# Patient Record
Sex: FEMALE | Race: BLACK OR AFRICAN AMERICAN | NOT HISPANIC OR LATINO | URBAN - METROPOLITAN AREA
[De-identification: names, ages, dates, MRNs, and addresses within clinical notes are randomized per-mention and may not be internally consistent; named-entity substitution may affect disease eponyms.]

---

## 2019-09-25 ENCOUNTER — WALK IN (OUTPATIENT)
Dept: URGENT CARE | Age: 29
End: 2019-09-25

## 2019-09-25 VITALS
TEMPERATURE: 98.7 F | RESPIRATION RATE: 18 BRPM | HEART RATE: 66 BPM | SYSTOLIC BLOOD PRESSURE: 108 MMHG | DIASTOLIC BLOOD PRESSURE: 78 MMHG

## 2019-09-25 DIAGNOSIS — Z00.00 NORMAL PHYSICAL EXAM: Primary | ICD-10-CM

## 2019-09-25 DIAGNOSIS — J00 ACUTE NASOPHARYNGITIS (COMMON COLD): ICD-10-CM

## 2019-09-25 DIAGNOSIS — H66.001 NON-RECURRENT ACUTE SUPPURATIVE OTITIS MEDIA OF RIGHT EAR WITHOUT SPONTANEOUS RUPTURE OF TYMPANIC MEMBRANE: ICD-10-CM

## 2019-09-25 PROCEDURE — X0943 AMG SELF PAY VISIT: HCPCS | Performed by: NURSE PRACTITIONER

## 2019-09-25 RX ORDER — AMOXICILLIN AND CLAVULANATE POTASSIUM 875; 125 MG/1; MG/1
1 TABLET, FILM COATED ORAL 2 TIMES DAILY
Qty: 20 TABLET | Refills: 0 | Status: SHIPPED | OUTPATIENT
Start: 2019-09-25 | End: 2019-10-05

## 2019-09-25 ASSESSMENT — ENCOUNTER SYMPTOMS
DIZZINESS: 0
COUGH: 0
HEADACHES: 1
LIGHT-HEADEDNESS: 0
CONSTITUTIONAL NEGATIVE: 1
RHINORRHEA: 1
TROUBLE SWALLOWING: 0
RESPIRATORY NEGATIVE: 1
NUMBNESS: 0
SORE THROAT: 0
SINUS PRESSURE: 1
WEAKNESS: 0

## 2019-09-26 ENCOUNTER — TELEPHONE (OUTPATIENT)
Dept: SCHEDULING | Age: 29
End: 2019-09-26

## 2025-01-17 ENCOUNTER — TELEPHONE (OUTPATIENT)
Dept: OBGYN CLINIC | Facility: CLINIC | Age: 35
End: 2025-01-17

## 2025-01-20 NOTE — TELEPHONE ENCOUNTER
Name and  verified. Patient would like to establish care with the midwife office. Patient does see an NP somewhere else but this RN said to just establish care here so we do not have to request records. Missed menses appointment scheduled for  with Katherine

## 2025-01-31 ENCOUNTER — OFFICE VISIT (OUTPATIENT)
Dept: OBGYN CLINIC | Facility: CLINIC | Age: 35
End: 2025-01-31
Payer: COMMERCIAL

## 2025-01-31 VITALS
WEIGHT: 172 LBS | DIASTOLIC BLOOD PRESSURE: 83 MMHG | HEIGHT: 63 IN | BODY MASS INDEX: 30.48 KG/M2 | SYSTOLIC BLOOD PRESSURE: 118 MMHG | HEART RATE: 92 BPM

## 2025-01-31 DIAGNOSIS — N92.6 MISSED MENSES: Primary | ICD-10-CM

## 2025-01-31 DIAGNOSIS — O99.210 OBESITY IN PREGNANCY (HCC): ICD-10-CM

## 2025-01-31 LAB
CONTROL LINE PRESENT WITH A CLEAR BACKGROUND (YES/NO): YES YES/NO
KIT LOT #: NORMAL NUMERIC
PREGNANCY TEST, URINE: POSITIVE

## 2025-01-31 PROCEDURE — 99203 OFFICE O/P NEW LOW 30 MIN: CPT | Performed by: ADVANCED PRACTICE MIDWIFE

## 2025-01-31 PROCEDURE — 81025 URINE PREGNANCY TEST: CPT | Performed by: ADVANCED PRACTICE MIDWIFE

## 2025-01-31 NOTE — PROGRESS NOTES
CHIEF COMPLAINT:  Chief Complaint   Patient presents with    Gyn Exam     Missed mense; LMP 2024, 6w 6d , No nausea no spotting only cramping a week before finding out.         HPI:  Lainey is 34 year old, here today, with her partner, for a missed menses visit.  Currently, she is feeling well. Denies 1st trimester danger signs. Happy about pregnancy. They have been trying to conceive. Taking prenatal vitamins and vitamin D.    Patient's last menstrual period was 2024 (exact date).  Period Cycle (Days): 28 day  Period Duration (Days): 5-6 days  Period Flow: moderate  Use of Birth Control (if yes, specify type): None  Hx Prior Abnormal Pap: No  Pap Date: 24  Pap Result Notes: normal       2024, chemical pregnancy  2024, chemical pregnancy  Current, denies 1T danger signs  LMP 25 --> 6w6d --> HALINA 25    /FOB: present, supportive  Family H/O of genetic disorders: denies  Cats at home: no. 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 No pertinent surgical history.   Family History   Problem Relation Age of Onset    Diabetes Father       Social History:   Social History     Socioeconomic History    Marital status:    Tobacco Use    Smoking status: Never    Smokeless tobacco: Never   Substance and Sexual Activity    Alcohol use: Not Currently    Drug use: Never     Social Drivers of Health      Received from [facility]          Medications (Active prior to today's visit):  No current outpatient medications on file.       Allergies:  Allergies[1]    REVIEW OF SYSTEMS:  Review of Systems   Constitutional: Negative.    Gastrointestinal:  Negative for nausea and vomiting.   Genitourinary:  Negative for pelvic pain and vaginal bleeding.        PHYSICAL  EXAM:  Vitals:    01/31/25 0803   BP: 118/83   Pulse: 92     Physical Exam  Vitals reviewed.   Constitutional:       Appearance: Normal appearance.   HENT:      Head: Normocephalic.   Pulmonary:      Effort: Pulmonary effort is normal.   Neurological:      Mental Status: She is alert and oriented to person, place, and time.   Psychiatric:         Behavior: Behavior normal.         Thought Content: Thought content normal.         Judgment: Judgment normal.          Recent Results (from the past 24 hours)   POC Urine pregnancy test [[id number]]    Collection Time: 01/31/25  8:16 AM   Result Value Ref Range    Pregnancy Test, Urine positive     Control Line Present with a clear background (yes/no) yes Yes/No    Kit Lot # [id number] Numeric    Kit Expiration Date 02/03/2026 Date        ASSESSMENT/PLAN:   Lainey was seen today for gyn exam.    Diagnoses and all orders for this visit:    Missed menses  -     POC Urine pregnancy test [[id number]]    Obesity in pregnancy (HCC)          - Will need baby ASA after 1st trimester     Today's UCG positive result reviewed with patient  Appropriate for CNM care   Already taking prenatal vitamins      Pre-eclampsia Risk Assessment:   High Risk Factors (any 1 of below):   - H/O preeclampsia in prior pregnancy  - Autoimmune disease (lupus, antiphospholipid syndrome)  - Multifetal pregnancy  - cHTN  - Diabetes  Moderate Risk Factors (any 2 of below)    - Nulliparus  - Obesity  - H/O preeclampsia in 1st degree relative  - Socioeconomic characeristics (AA race, low socioeconomic status)  - AMA  - Personal H/O prior SGA or low birth weight, adverse pregnancy outcome, >10yr pregnancy interval    Next visit: Patient to schedule Nurse Education visit, next available, and NOB for 12wga    Counseling included:  -- CNM care and protocols  -- Discussed importance of folic acid and vitamin D  -- Reviewed pregnancy recommendations regarding weight gain, diet/hydration, and food safety  -- Travel discussed,  avoid travel to zika zones/mosquito bites if in these zones  -- Warning signs reviewed. Advised to call office if occur  -- Reviewed genetic/chromosomal testing options for pregnancies  -- Evidence that baby ASA reduces risk of preeclampsia  -- Schedule RN OB ED visit   -- I have spent 30 minutes total time on the day of the encounter, including: preparing to see the patient, ordering/reviewing labs, performing a medically appropriate exam, and providing care coordination. face to face counseling, chart review, orders and coordination of care    Pt verbalized understanding. All questions answered. No barriers to learning identified         [1] Not on File

## 2025-01-31 NOTE — PATIENT INSTRUCTIONS
Healthy Eating Habits During Pregnancy    It’s important to develop healthy eating habits while you are pregnant, for you as well as for your baby. Here are some ways to stay healthy.  Aim for a healthy weight  A slow, steady rate of weight gain is often best. After the first trimester, you may gain about a pound a week. If you were overweight before pregnancy, you need to gain fewer pounds. Your healthcare provider can give you a healthy weight goal for your pregnancy.  Don’t diet  Now is not the time to diet. You may not get enough of the nutrients you and your baby need. Instead, learn how to be a healthy eater. Start by doing it for your baby. Soon, you may do it for yourself.  Vitamins and supplements  Talk with your healthcare provider about taking these and other prenatal vitamins and supplements.  Iron makes the extra blood you need now.  Calcium and vitamin D help build and keep strong bones.  Folic acid helps prevent certain birth defects.  Iodine helps the thyroid work right.  Some vitamins may not be safe to take. Your healthcare provider will tell you which ones to avoid.  Fluids  Drink at least 8 to 10 cups of fluid daily. Your baby needs fluids. Fluids also decrease constipation, flush out toxins and waste, limit swelling, and help prevent bladder infections. Water is best. Other good choices are:  Water or seltzer water with a slice of lemon or lime (These can also help ease an upset stomach.)  Clear soups that are low in salt  Low-fat or fat-free milk, soy or rice milk with calcium added  Popsicles or gelatin  Things to avoid  Some things might harm your growing baby. Don’t eat or drink:  Alcohol  Unpasteurized dairy foods and juices  Raw or undercooked meat, poultry, fish, or eggs  Unwashed fruits and vegetables  Prepared meats, like deli meats or hot dogs, unless heated until steaming hot  Fish that are high in mercury, like shark, swordfish, cesar mackerel, tilefish, and albacore tuna  Things to  limit  Ask your healthcare provider whether it’s safe to eat or drink:  Caffeine  Artificial sweeteners  Organ meats  Certain types of fish  Fish and shellfish that contain mercury in lower amounts, like shrimp, canned light tuna, salmon, pollock, and catfish  Bob last reviewed this educational content on 2018 The [employer] [address] [place] [address], [place] [address]. All rights reserved. This information is not intended as a substitute for professional medical care. Always follow your healthcare professional's instructions.        Nutrition During Pregnancy    Having a healthy baby depends mostly on you. What you eat matters to your baby and your health. During pregnancy, you will likely need about 300 more calories per day than before you became pregnant. Each day, try to eat the number of servings listed here for each food group. In addition, cut down on salt and caffeine. Limit the amount of sweets and high-fat foods you eat. Don’t smoke or drink alcohol.  Important: See your healthcare provider as often as requested. If you have any questions, be sure to ask them.  Fruits  2 cups  Examples of 1-cup servings:  1 medium apple  1 medium orange  1 medium banana  1 cup chopped fruit  1 cup 100% fruit juice (pasteurized)  1/2 cup dried fruit Vegetables  2-1/2 to 3 cups   Examples of 1 servin cups raw, leafy greens  1 cup raw or cooked cut-up vegetables  1 cup 100% vegetable juice (pasteurized) Grains & Cereals*  6 to 8 ounces  Examples of 1-ounce servings:  1 slice bread  1/2 cup cooked rice  1/2 cup cooked cereal  1/2 cup pasta  1 ounce cold cereal Fats & Oils  6 to 8 teaspoons   Dairy**  3 cups  Examples of 1-cup servings:  1 cup milk  1 cup yogurt  1-1/2 ounces natural cheese  2 ounces processed cheese Protein---  5 to 6-1/2 ounces  Examples of 1-ounce servings:  1 egg  1 ounce of lean meat, poultry, or fish  1/4 cup cooked beans  1 tablespoon peanut butter  1/2 ounce nuts  Fluids  8 or more 8-ounce glasses  Examples:  Water  Diluted juices: Apple, orange, cranberry  Mineral water  Clear soups, broth     *Note: Choose whole grains whenever possible.  ** Note: Try to choose low-fat options; avoid soft cheeses and unpasteurized milk.  --- Notes: Avoid raw or undercooked meats, eggs, and seafood. fish, and shellfish. Also, some types of fish, like shark, swordfish, and cesar mackerel should not be eaten during pregnancy. Avoid hot dogs, luncheon meats, and cold cuts unless heated to steaming just before being served. Ask your healthcare provider about safe choices.     Prenatal supplements  A prenatal supplement is a pill that you take daily during pregnancy. It helps make sure you’re getting the right amount of certain nutrients that are important to your baby. Ask your healthcare provider to help you choose the best one for you. Important nutrients during pregnancy include:  Folic acid. It's best to start taking this supplement 1 month before you start trying to get pregnant. Folic acid helps prevent certain problems in your baby. During pregnancy, you need to take 400 micrograms (mcg) of folic acid every day for the first 2 to 3 months after conception, and then 600 mcg is needed for growing fetus and placenta.  Iron, calcium, and vitamin D. You may also be advised to take these supplements during pregnancy. They help keep you and your baby healthy. Be sure to take them at different times because calcium makes it hard for the body to absorb iron. Taking iron with orange juice helps to increase its absorption.   [employer] last reviewed this educational content on 12/1/2017 © 2000-2020 The [employer], [employer]. [address] [place] [address], [place] [address]. All rights reserved. This information is not intended as a substitute for professional medical care. Always follow your healthcare professional's instructions.        Pregnancy: Planning Your Exercise Routine    While you’re pregnant, an  exercise routine helps both your mind and your body feel good. It tones your muscles and makes them stronger. It also gives you and your baby more oxygen.  The right exercise for you  Overall conditioning is best for you and your baby. Try walking, swimming, or riding a stationary bike. Always warm up, cool down, and drink enough fluids. Keep a snack close by in case your blood sugar gets low. Discuss exercise choices with your healthcare provider. Talk about the following:  If you already exercise, find out how to adapt your routine while you’re pregnant. Keep the intensity of the exercise moderate.  Ask if there are any local prenatal exercise classes, like yoga or water aerobics. Find out which prenatal exercise videos are good choices.  If you were not exercising before your pregnancy, find out the best way to start. Now is not the time to begin a new workout on your own. Start slowly. Listen to your body.  Ask which forms of exercise you should avoid. These may include risky activities like hot yoga, horseback riding, scuba diving, skiing, skating, and contact sports.  Pelvic tilts  These help strengthen your stomach muscles and low back. You can do pelvic tilts instead of sit-ups.  Do this exercise on your hands and knees.  Relax the back of your neck. Pull your stomach in until your low back flattens.  Hold for 30 seconds. Release. Repeat 10 times. Do this twice a day.  Kegel exercises  Kegel exercises strengthen the pelvic muscles. Doing Kegels daily helps prepare these muscles for delivery. Kegels also help ease your recovery. You exercise these muscles by tightening, holding, then relaxing them. To do 1 type of Kegel exercise, contract as if you were stopping your urine stream (but do it when you’re not urinating). Hold for 10 seconds, then repeat 10 times, a few times a day.  Tips to add activity  Here are some tips to follow:  Park the car farther from a store and walk.  If you can, do errands on foot  instead of driving.  Walk across the office to talk to someone in person instead of calling.  While waiting for appointments, go up and down stairs or around the block.   Tips to stay active  Here are some tips to follow:  Maintain your routine. But exercise less intensely if you feel tired.  Base your workout on how you feel, not your heart rate. Heart rates aren’t a good way to measure effort during pregnancy.  Avoid exercising on your back after week 16.   What are the warning signs that I should stop exercising?  Stop exercising and call your healthcare provider if you have any of these symptoms:  Vaginal bleeding   Dizziness or feeling faint   Increased shortness of breath   Chest pain   Headache   Muscle weakness   Calf (back of the leg) pain or swelling    Uterine contractions or pre-term labor   Decreased fetal movement   Fluid leaking (or gushing) from your vagina  [employer] last reviewed this educational content on 1/1/2018  © 5445-6788 [employer]. [address] [place] [address]. All rights reserved. This information is not intended as a substitute for professional medical care. Always follow your healthcare professional's instructions.        Exercise During Pregnancy  Regular exercise can help you adapt to the changes your body is going through during pregnancy. Exercising may help you relax, and it gets you ready for labor and delivery. Talk to your healthcare provider about the kinds of activities you can do. Then go ahead and enjoy them.    Get started  Even if you didn’t exercise before pregnancy, it is not too late to start. Choose an activity that you like and that fits your lifestyle. Begin slowly and build up a little at a time. Be sure to check with your healthcare provider before starting any exercise program. The following tips may help you get started:  Choose a time and place to exercise each day.  Wear loose-fitting clothes and comfortable athletic shoes.  Stretch  before and after you exercise. (Be sure to stretch slowly and to hold stretches for 30 to 40 seconds.)  Be active  Unless your healthcare provider says otherwise, try to exercise for 30 minutes or more most days of the week:  Overall conditioning, like swimming, bicycling, or walking, is especially beneficial.  Aerobics and exercises that increase your pulse rate help condition your body and strengthen your heart. Ask about special prenatal aerobics classes.  Exercise safely  These tips will help you have a safe, healthy workout:  Stay cool. Stop exercising if you feel overheated.  Slow down if you’re out of breath. If you can’t talk during exercise, lower the intensity of the workout.  Monitor the intensity of your workout. Only do moderate-intensity (not strenuous) exercise.  Stay off your back. Lying on your back can decrease blood flow to your baby.  Drink water before, during and after your workout.  Eat 300 extra calories a day. A light snack before and after you exercise will help keep your energy up.  Avoid activities requiring balancing skills later in pregnancy.  Do Kegel exercises  Kegel exercises strengthen the pelvic floor muscles used in childbirth. These muscles are the same ones used to stop the flow of urine. Do Kegel exercises daily:  Squeeze your pelvic floor muscles for a count of 3.  Relax, then squeeze again.  Repeat 10 to 15 times in a row at least 3 times a day.  You can do Kegel exercises anytime and anywhere.  Keep walking  No matter what other exercise you do, try to walk whenever you can:  If you’re working all day, take a lunchtime walk in the park with a friend.  When you shop, park away from the store entrance and walk the extra distance.  Take the stairs instead of the elevator.     When to stop exercising and call your healthcare provider  Call your healthcare provider right away if you have:  Shortness of breath before starting exercise  Vaginal bleeding  Dizziness or feeling  faint  Chest pain  Headache  Decreased fetal movement   contractions   Muscle weakness  Calf pain or swelling  Fluid leaking from the vagina   [employer] last reviewed this educational content on 2017 The [employer], [employer]. [address] [place] [address], [place] [address]. All rights reserved. This information is not intended as a substitute for professional medical care. Always follow your healthcare professional's instructions.        Pregnancy: Your Weight     Your weight will be checked regularly by your healthcare provider.   Being a healthy weight is important for both you and your baby. The weight you gain now is not just extra fat. It is also the weight of your baby. And it is the increased blood and fluids to support the baby. A slow, steady rate of gain is best. How much you should gain depends on your weight before getting pregnant. Check with your healthcare provider to find out what is right for you.  Talk to your healthcare provider if you have any questions.   If you gain too much  Gaining too much weight might cause you to feel tired or you could have a harder pregnancy or birth. If you and your healthcare provider decide you’re gaining too much:  Eat fewer fats and sugars. Instead, eat fruit, vegetables, and whole-grain foods.  Drink plenty of water between meals.  Get at least 20 minutes of light exercise, like walking, each day.  Don’t diet. You might not get enough of the nutrients you or your baby needs.  Keep a diet diary to help you gauge what and how much you are eating.  If you’re not gaining enough  If you don’t gain enough, your baby could be too small or have health problems. Women tend to gain most of their weight in the second and third trimesters. For now:  Eat many types of foods. Make sure you get enough calcium, protein, and carbohydrates.  Don’t skip meals.  Eat healthy snacks.  Pick nutrient-dense, high-calorie healthy food like trail mix or protein  amy.  See a dietitian for help.  Talk to your healthcare provider if you have had an eating disorder or problems with certain foods.  The following are ways to get more calories:  Eat breakfast every day. Peanut butter or a slice of cheese on toast can give you an extra protein boost.  Snack between meals. Yogurt and dried fruits can provide protein, calcium, and minerals.  Try to eat more foods that are high in good fats, like nuts, fatty fish, avocados, and olive oil.  Drink juices made from real fruit that are high in vitamin C or beta carotene, like grapefruit juice, orange juice, papaya nectar, apricot nectar, and carrot juice.  Avoid junk food, like foods high in sugar.  [employer] last reviewed this educational content on 1/1/2018 © 2000-2020 The [employer]. [address] [place] [address]. All rights reserved. This information is not intended as a substitute for professional medical care. Always follow your healthcare professional's instructions.        Dental Care for Pregnant Women  Pregnancy is a time when your oral health needs more attention. Hormone changes during pregnancy can cause certain problems with teeth and gums, and make treatment more complicated.  How pregnancy affects oral health  During pregnancy, hormone changes can cause swollen, bleeding, and irritated gums (gingivitis). Your gums may be very sore, and brushing and flossing may cause discomfort. If left untreated, gingivitis can lead to a more serious gum disease called periodontitis. Severe periodontitis can lead to tooth loss.  Some pregnant women also have small bright-red growths on their gums that bleed easily. These are often called “pregnancy tumors.” They are not cancer. They usually go away right after birth. Talk with your dentist or healthcare provider if you have concerns.  Keeping a healthy mouth  Brush twice daily with fluoride toothpaste. Floss at least once a day.  If you have morning sickness,  rinse your mouth with a teaspoon of baking soda mixed with water after vomiting. Do not brush your teeth right after vomiting. This can remove tooth enamel.  See your dentist for cleanings and checkups more often if needed. This is especially true in your second and third trimesters.  Ask your dentist or healthcare provider if you should use a special mouth rinse to help prevent gingivitis.  Tell your dentist or healthcare provider about any changes in your mouth, such as soreness or bleeding.  Safety concerns  Make sure to tell your dentist that you’re pregnant. He or she can help you stay safe. If you need to have dental X-rays during pregnancy, he or she will make sure you are fully protected. You will wear a lead apron over your belly during the X-ray process. The apron helps block radiation from the X-rays.  If you need to take medicines like antibiotics or pain relievers for dental problems, talk with your healthcare providers first. Your providers will discuss the risks and benefits of taking these during pregnancy.  If you have a high-risk pregnancy, your dentist and your healthcare provider may advise that certain dental treatments wait until after you give birth.  StayWell last reviewed this educational content on 12/1/2017 © 2000-2020 [employer], [employer]. [address] [place] [address], [place] [address]. All rights reserved. This information is not intended as a substitute for professional medical care. Always follow your healthcare professional's instructions.       Pregnancy: Your First Trimester Changes  The first trimester is a time of rapid development for your baby. Because your baby is growing so quickly, it is important that you start a healthy lifestyle right away. By the end of the first trimester, your baby has formed all of its major body organs and weighs just over an ounce.     Actual size of baby is 1/4\"    Month 1 (weeks 1 to 4)  The placenta (the organ that nourishes your baby) begins to  form. The brain, spinal cord, heart, gastrointestinal tract, and lungs begin to develop. Your baby is about 1/4-inch long by the end of the first month.     Actual size of baby is 1\"      Month 2 (weeks 5 to 8)  All of your baby’s major body organs form. The face, fingers, toes, ears, and eyes appear. By the end of the month, your baby is about 1-inch long.     Actual size of baby is 3\"      Month 3 (weeks 9 to 12)  Your baby can open and close its fists and mouth. The sexual organs begin to form. As the first trimester ends, your baby is about 3-inches long.  StayWell last reviewed this educational content on 10/1/2017  © 4190-2214 [employer], [employer]. [address] [place] [address], [place] [address]. All rights reserved. This information is not intended as a substitute for professional medical care. Always follow your healthcare professional's instructions.        Adapting to Pregnancy: First Trimester    As your body adjusts, you may have to change or limit your daily activities. You’ll need more rest. You may also need to use the energy you have more wisely.  Your changing body  Almost every part of your body is affected as you adapt to pregnancy. The uterus and cervix will begin to soften right away. You may not look very pregnant during the first 3 months. But you are likely to have some common signs of early pregnancy:  Nausea  Fatigue  Frequent urination  Mood swings  Bloating of the belly  Constipation  Heartburn  Missed or light periods (first trimester bleeding)  Nipple or breast tenderness and breast swelling  It’s not too late to start good habits  What matters most is protecting your baby from this moment on. If you smoke, drink alcohol, or use drugs, now is the time to stop. If you need help, talk with your healthcare provider:  Smoking increases the risk of stillbirth or having a low-birth-weight baby. If you smoke, quit now.  Alcohol and drugs have been linked with miscarriage, birth defects,  intellectual disability, and low birth weight. Do not drink alcohol or take drugs.  Tips to relieve nausea  Although nausea can happen at any time of the day, it may be worse in the morning. To help prevent nausea:  Eat small, light meals at frequent intervals.  Drink fluids often.  Get up slowly. Eat a few unsalted crackers before you get out of bed.  Avoid smells that bother you.  Avoid spicy and fatty foods.  Eat an ice pop in your favorite flavor.  Get plenty of rest.  Ask your healthcare provider about taking jasen or vitamin B6 for nausea and vomiting.  Talk with your healthcare provider if you take vitamins that upset your stomach.  Work concerns  The end of the first trimester is a good time to discuss working during pregnancy with your employer. Follow your healthcare provider’s advice if your job needs you to stand for a long time, work with hazardous tools, or even sit at a desk all day. Your workspace, workload, or scheduled hours may need to be adjusted. Perhaps you can change body postures more often or take an extra break.  Advice for travel  Talk to your healthcare provider first, but the second trimester may be the best time for any travel. You may be advised to avoid certain trips while you’re pregnant. Food and water can be concerns in developing countries. Travel by car is a good choice, as you can stop, get out, and stretch. Bring snacks and water along. Fasten the lap belt below your belly, low over your hips. Also be sure to wear the shoulder harness.  Intimacy  Unless your healthcare provider tells you to, there is no reason to stop having sex while you’re pregnant. You or your partner may notice changes in desire. Desire may be less in the first trimester, due to nausea and fatigue. In the second trimester, sex may be very enjoyable. The third trimester can be a challenge comfort-wise. Try different positions and see what’s best for you both.  StayWell last reviewed this educational content  on 10/1/2017  © 5585-7641 [employer]. [address] [place] [address], [place] [address]. All rights reserved. This information is not intended as a substitute for professional medical care. Always follow your healthcare professional's instructions.        Comfort Tips During Pregnancy    Talk with your healthcare provider before using pain-relieving medicine at any time during your pregnancy.  First trimester tips  Nausea  Get up slowly. Eat a few unsalted crackers before you get out of bed.  Avoid smells that bother you.  Eat small bland low fat, light high-protein meals at frequent intervals.  Sip on water, weak tea, or clear soft drinks, like ginger ale. Eat ice chips.  Fatigue  Take catnaps when you can.  Get regular exercise.  Accept help from others.  Practice good sleep habits, like going to bed and getting up at the same time each day. Use your bed only for sleep and sex.  Mood swings  Talk about your feelings with others, including other mothers.  Limit sugar, chocolate, and caffeine.  Eat a healthy diet. Don’t skip meals.  Get regular exercise.  Headaches  Get fresh air and exercise.  Relax and get enough rest.  Check with your healthcare provider before taking any pain medicines.  Second trimester tips  Here are some suggestions to help you cope:  To limit ankle swelling, sit with your feet raised or wear support hose.  If you have pain in your groin and stomach (round ligament pain), avoid sudden twisting movements.  For leg cramps, flexing your foot often brings immediate relief. You also may try massaging your calf in long, downward strokes, or stretching your legs before going to bed. Get enough exercise and wear shoes with flexible soles.  Third trimester tips  Reducing heartburn  Eat small, light meals throughout the day rather than 3 large ones.  Sleep with your upper body raised 6 inches. Don’t lie down until 2 hours after you eat.  Don't eat greasy, fried, or spicy foods.  Avoid citrus  fruits and juices.  Treating constipation  Eat foods high in fiber (whole-grain foods, fresh fruit and vegetables).  Drink plenty of water.  Get regular exercise.  Discuss other medicines (like docusate and psyllium) with your healthcare provider.  Taking care of your breasts  Avoid using harsh soaps or alcohol, which can cause excessive dryness.  Wear nursing bras. They provide more support than regular bras and can be used after pregnancy if you breastfeed.  Getting a good night’s sleep  Take a warm shower before bed.  Sleep on a firm mattress.  Lie on your side with 1 leg crossed over the other.  Use pillows to support arms, legs, and belly.  [employer] last reviewed this educational content on 10/1/2017  © 6834-2799 [employer]. [address] [place] [address], [place] [address]. All rights reserved. This information is not intended as a substitute for professional medical care. Always follow your healthcare professional's instructions.        Bleeding During Early Pregnancy     Ultrasound can help check the health of your fetus.     If you’ve had bleeding early in your pregnancy, you’re not alone. Many other pregnant women have had early bleeding, too. And in most cases, nothing is wrong. But your healthcare provider still needs to know about it. He or she may want to do tests to find out why you’re bleeding. Call your healthcare provider if you notice bleeding during pregnancy.  What causes early bleeding?  The cause of bleeding early in pregnancy is often unknown. But many factors early on in pregnancy may lead to bleeding or spotting. These include sexual intercourse, which may cause bleeding in any trimester. Here are some other causes:  Implantation of the embryo on the uterine wall  Subchorionic hemorrhage (bleeding between the sac membrane and the uterus)  Miscarriage  Ectopic (tubal) pregnancy  If you notice spotting  Spotting (very light bleeding) is the most common type of bleeding in early  pregnancy. If you notice it, call your healthcare provider. Chances are, he or she will tell you that you can care for yourself at home.  If tests are needed  Depending on how much you bleed, your healthcare provider may ask you to come in for some tests. A pelvic exam, for instance, can help see how far along your pregnancy is. You also may have an ultrasound or a Doppler test. These imaging tests use sound waves to check the health of your fetus. The ultrasound may be done on your belly or inside your vagina. Your healthcare provider also may order a special blood test. This test compares your hormone levels in blood samples taken 2 days apart. The results can help your healthcare provider learn more about the implantation of the embryo. Your blood type will also need to be checked to evaluate whether you will need to be treated for Rh sensitization.   Warning signs  If your bleeding doesn’t stop or if you notice any of the following, seek medical help right away:  Soaking a sanitary pad each hour  Bleeding like you’re having a period  Cramping or severe belly pain  Feeling dizzy or faint  Tissue passing through your vagina  Bleeding at any time after the first trimester  Questions you may be asked  Though not normal, bleeding early in pregnancy is common. If you’ve noticed any bleeding, you may be concerned. But keep in mind that bleeding alone doesn’t mean something is wrong. Call your healthcare provider right away, though. He or she may ask you questions like these to help find the cause of your bleeding:  When did your bleeding start?  Is your bleeding very light (spotting) or is it like a period?  Is the blood bright red or brownish?  Have you had sexual intercourse recently?  Have you had pain or cramping?  Have you felt dizzy or faint?  Monitoring your pregnancy  Bleeding will often stop as quickly as it began. Your pregnancy may go on a normal path again. You may need to make a few extra prenatal visits.  But you and your baby will most likely be fine.  [employer] last reviewed this educational content on 6/1/2016  © 7512-3268 [employer]. [address] [place] [address], [place] [address]. All rights reserved. This information is not intended as a substitute for professional medical care. Always follow your healthcare professional's instructions.        Abdominal Pain and Early Pregnancy    The tests you had show that you are pregnant, but the exact cause of your pain isn’t clear.   Some pain and bleeding are common early in pregnancy. Often they stop, and you can go on to have a normal pregnancy and baby. Other times the pain or bleeding can be signs of a miscarriage or ectopic pregnancy. An ectopic pregnancy is a very serious problem. At this time it is unclear if your pregnancy will continue normally, if you will have a miscarriage, or if you could have an ectopic pregnancy. Below is some information about this.   Miscarriage  At this time we don’t know whether you will have a miscarriage, or if things will clear up and your pregnancy will continue normally. We understand that this is emotionally difficult. There is little we can say to change the way you feel. But understand that miscarriages are common.   About 1 or 2 out of every 10 pregnancies end this way. Some end even before you know you are pregnant. This happens for a number of reasons, and usually we never figure out why. It’s important you know that it is not your fault. It didn’t happen because you did anything wrong.   Having sex or exercising does not cause a miscarriage. These activities are usually safe unless you have pain or bleeding or your healthcare provider tells you to stop. Even minor falls won’t cause a miscarriage. Miscarriages happen because things were not developing as they were supposed to. No medicine can prevent a miscarriage.   Ectopic pregnancy  In a normal pregnancy, the fertilized egg attaches to the wall of the womb  (uterus). In an ectopic or tubal pregnancy, the fertilized egg attaches outside the uterus, usually in the fallopian tube. Very rarely, the egg attaches to an ovary or somewhere else in the abdomen. An ectopic pregnancy is much less common than a miscarriage, but it is very serious. The baby can't survive, and as it grows it can rupture the tube. This can cause internal bleeding and even death. Risk factors for an ectopic are:   An ectopic pregnancy in the past  Pelvic inflammatory disease (PID)  Endometriosis  Smoking  An IUD  Additional tests  Because we don’t know what’s causing your symptoms, you will need more tests to figure out what the problem is. You may need the following.   Ultrasound  An ultrasound can usually find a normal pregnancy as early as 4 to 5 weeks along. If the ultrasound does not show the baby inside the uterus, it means one of the following.   You have a normal pregnancy less than 4 weeks along  You are having or recently had a miscarriage  You have an ectopic pregnancy  Quantitative HCG  This test measures the amount of a pregnancy hormone in your blood. Comparing today's test result to a repeat test in 2 days will show whether you have a normal pregnancy.   Laparoscopy  This is a type of surgery. The healthcare provider will put a tube with a light inside your belly (abdomen) to look directly at your pelvic organs. This test is used when it is not safe to wait 2 days for blood test results.   Important information  If you do have an ectopic pregnancy, there is a small chance that the growing fetus can tear the fallopian tube. This can cause severe internal bleeding. If this happens, you may have:   Sudden severe pain in your lower abdomen  Vaginal bleeding  Weakness, dizziness, and sometimes fainting  If any of these symptoms occur:  Call 911or return right away to the hospital.  Don't drive yourself.  Don't go to your healthcare provider's office or to a clinic. Go to the hospital.  Home  care  Follow these guidelines to help care for yourself at home:   Rest until your next exam. Don’t do anything strenuous.  Eat a light diet with foods that are easy to digest.  Don’t have sex until your healthcare provider says it’s OK.  Follow-up care  Follow up with your healthcare provider, or as advised. If you were told to have a repeat blood test in 2 days, it’s important to get it done.   If you had an X-ray or ultrasound, a radiologist will review it. You will be told of any new findings that may affect your care.   Call 911  Call 911 if you have any of these:   Severe pain and very heavy bleeding  Severe lightheadedness, passing out, or fainting  Rapid heart rate  Trouble breathing  Confused or difficulty waking up  When to seek medical advice  Call your healthcare provider right away if any of these occur:   The pain in your abdomen gets worse, either suddenly or gradually.  You are dizzy or weak when you stand.  You have heavy vaginal bleeding. This means soaking 1 pad an hour for 3 hours.  You have vaginal bleeding for more than 5 days.  You have repeated vomiting or diarrhea.  The pain in your abdomen moves to the lower right.  You have blood in your vomit or bowel movements. This will be dark red or black.  You have a fever of 100.4ºF (38ºC) or higher, or as directed by your healthcare provider.  [employer] last reviewed this educational content on 9/1/2019 © 2000-2020 [employer]. [address] [place] [address], [place] [address]. All rights reserved. This information is not intended as a substitute for professional medical care. Always follow your healthcare professional's instructions.

## 2025-02-04 ENCOUNTER — TELEPHONE (OUTPATIENT)
Dept: OBGYN CLINIC | Facility: CLINIC | Age: 35
End: 2025-02-04

## 2025-02-04 ENCOUNTER — LAB ENCOUNTER (OUTPATIENT)
Dept: LAB | Facility: HOSPITAL | Age: 35
End: 2025-02-04
Attending: ADVANCED PRACTICE MIDWIFE
Payer: COMMERCIAL

## 2025-02-04 DIAGNOSIS — O20.9 VAGINAL BLEEDING AFFECTING EARLY PREGNANCY (HCC): Primary | ICD-10-CM

## 2025-02-04 LAB
ANTIBODY SCREEN: NEGATIVE
B-HCG SERPL-ACNC: 2446 MIU/ML
RH BLOOD TYPE: POSITIVE

## 2025-02-04 PROCEDURE — 36415 COLL VENOUS BLD VENIPUNCTURE: CPT | Performed by: ADVANCED PRACTICE MIDWIFE

## 2025-02-04 PROCEDURE — 86850 RBC ANTIBODY SCREEN: CPT | Performed by: ADVANCED PRACTICE MIDWIFE

## 2025-02-04 PROCEDURE — 86901 BLOOD TYPING SEROLOGIC RH(D): CPT | Performed by: ADVANCED PRACTICE MIDWIFE

## 2025-02-04 PROCEDURE — 86900 BLOOD TYPING SEROLOGIC ABO: CPT | Performed by: ADVANCED PRACTICE MIDWIFE

## 2025-02-04 PROCEDURE — 84702 CHORIONIC GONADOTROPIN TEST: CPT | Performed by: ADVANCED PRACTICE MIDWIFE

## 2025-02-04 NOTE — TELEPHONE ENCOUNTER
Missed menses appointment on 25.with Sydney Wang. 7w 3d gestation by LMP. Pt began having moderate/heavy bleeding and passing clots last night. Pt feels she had a miscarriage. Currently, pt having mild cramping and light spotting. Pt wants to know if she needs to follow up with one of the providers.     Nirmala notified and pt to try and see a provider tomorrow, and possibly get a Hcg and type and screen done tonight. Pt informed of this message and pt scheduled to see Ambar tomorrow morning at 8:30 am. 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 Pt states she will go to the [facility] lab tonight and have labs drawn. Bleeding precautions reviewed with pt. Pt to go to ER if bleeding becomes heavy, severe abdominal pain, or fever or any concerns.

## 2025-02-05 ENCOUNTER — OFFICE VISIT (OUTPATIENT)
Dept: OBGYN CLINIC | Facility: CLINIC | Age: 35
End: 2025-02-05
Payer: COMMERCIAL

## 2025-02-05 VITALS
HEART RATE: 78 BPM | WEIGHT: 174 LBS | BODY MASS INDEX: 30.83 KG/M2 | HEIGHT: 63 IN | DIASTOLIC BLOOD PRESSURE: 78 MMHG | SYSTOLIC BLOOD PRESSURE: 127 MMHG

## 2025-02-05 DIAGNOSIS — O20.9 VAGINAL BLEEDING AFFECTING EARLY PREGNANCY (HCC): Primary | ICD-10-CM

## 2025-02-05 DIAGNOSIS — O20.0 THREATENED ABORTION, ANTEPARTUM (HCC): ICD-10-CM

## 2025-02-05 DIAGNOSIS — D25.9 UTERINE LEIOMYOMA, UNSPECIFIED LOCATION: ICD-10-CM

## 2025-02-05 PROCEDURE — 99213 OFFICE O/P EST LOW 20 MIN: CPT | Performed by: ADVANCED PRACTICE MIDWIFE

## 2025-02-05 NOTE — PROGRESS NOTES
Chief Complaint   Patient presents with    Gyn Exam     Patient started bleeding Monday night, noticed more redness this morning, not cramping today but overnight and Monday night period like cramps, Monday night passing of blood clots          Subjective:   Patient ID: Lainey Motley is a 34 year old female here with concerns of bleeding. She is 7w4d pregnancy by sure LMP and starting having intermittent vaginal bleeding Monday night with period like cramping. Bleeding small to moderate with some small clots. Denies soaking through a pad in an hour and severe pelvic/abdominal pain. She has a history of 2 chemical pregnancies for which she only tested positive with urine tests and multiple uterine fibroids ranging 1-3cm per her report. She had a HCG quant drawn last evening of 2446 and her blood type is O+.     Gyn Exam  Pertinent negatives include no abdominal pain, chills or fever.       History/Other:   Review of Systems   Constitutional:  Negative for chills and fever.   Gastrointestinal:  Negative for abdominal pain.   Genitourinary:  Positive for pelvic pain (\"period like cramps\") and vaginal bleeding.     No current outpatient medications on file.     Allergies:Allergies[1]    Objective:   Physical Exam  Vitals reviewed.   Constitutional:       General: She is not in acute distress.     Appearance: Normal appearance.   Cardiovascular:      Rate and Rhythm: Normal rate.   Pulmonary:      Effort: Pulmonary effort is normal.   Genitourinary:     General: Normal vulva.      Vagina: Bleeding (small amount of bright red bleeding in vaginal vault) present.      Cervix: Cervical bleeding (appears to be coming from uterus) present. No cervical motion tenderness.      Uterus: Not deviated, not fixed and not tender.       Adnexa:         Right: No mass or tenderness.          Left: No mass or tenderness.     Neurological:      Mental Status: She is alert and oriented to person, place, and time.   Psychiatric:          Mood and Affect: Mood normal.         Behavior: Behavior normal.         Thought Content: Thought content normal.         Judgment: Judgment normal.         Assessment & Plan:   1. Vaginal bleeding affecting early pregnancy (HCC)    2. Threatened , antepartum (HCC)    3. Uterine leiomyoma, unspecified location        Discussed possibility of very early pregnancy, pregnancy loss versus ectopic pregnancy.  Reviewed spotting in early pregnancy often times is normal.    Reviewed that spotting can also be caused by infection  Recommend vaginal cultures-patient consents   Discussed lack of reassuring rise in Hcg indicates likely pregnancy loss or ectopic pregnancy.   Discussed warning signs of ectopic pregnancy, including pelvic pain and unilateral pelvic pain. Instructed patient to call immediately if experiencing pelvic pain.  Discussed causes of pregnancy loss, most often due to chromosomal abnormalities making pregnancy not viable. Pregnancy loss is common. No reason to believe loss would happen again based on one loss. Patient to call if soaking through > 1 pad per hour or with pelvic pain. .     Repeat HCG 48 hours from initial draw    Discussed having hold and call US today if patient desires but given beta HCG of 2446, the US may not give us enough information to diagnose an AB or viable pregnancy at this time.  Given she is not having significant pain and suspicion for ectopic is low, she may wait to have US after next beta HCG or move forward with US today-at this time, Lainey desires to wait and have HCG drawn as discussed but to call M office if she changes her mind and wants US    Total time spent 20 minutes this calendar day which includes preparing to see the patient including chart review, obtaining and/or reviewing additional medical history, performing a physical exam and evaluation, documenting clinical information in the electronic medical record, independently interpreting results,  counseling the patient, communicating results to the patient/family/caregiver and coordinating care.          Orders Placed This Encounter   Procedures    Vaginitis Vaginosis PCR Panel    Chlamydia/Gc Amplification       Meds This Visit:  Requested Prescriptions      No prescriptions requested or ordered in this encounter       Imaging & Referrals:  None    ABHAY Paul under direct supervision and in collaboration with Ambar Tobin CNM          [1] Not on File

## 2025-02-06 ENCOUNTER — LAB ENCOUNTER (OUTPATIENT)
Dept: LAB | Facility: HOSPITAL | Age: 35
End: 2025-02-06
Attending: ADVANCED PRACTICE MIDWIFE
Payer: COMMERCIAL

## 2025-02-06 DIAGNOSIS — O20.0 THREATENED ABORTION, ANTEPARTUM (HCC): Primary | ICD-10-CM

## 2025-02-06 LAB
B-HCG SERPL-ACNC: 2353.3 MIU/ML
BV BACTERIA DNA VAG QL NAA+PROBE: NEGATIVE
C GLABRATA DNA VAG QL NAA+PROBE: NEGATIVE
C KRUSEI DNA VAG QL NAA+PROBE: NEGATIVE
C TRACH DNA SPEC QL NAA+PROBE: NEGATIVE
CANDIDA DNA VAG QL NAA+PROBE: NEGATIVE
DEPRECATED RDW RBC AUTO: 44.2 FL (ref 35.1–46.3)
ERYTHROCYTE [DISTWIDTH] IN BLOOD BY AUTOMATED COUNT: 13 % (ref 11–15)
HCT VFR BLD AUTO: 40.2 %
HGB BLD-MCNC: 13.5 G/DL
MCH RBC QN AUTO: 31 PG (ref 26–34)
MCHC RBC AUTO-ENTMCNC: 33.6 G/DL (ref 31–37)
MCV RBC AUTO: 92.4 FL
N GONORRHOEA DNA SPEC QL NAA+PROBE: NEGATIVE
PLATELET # BLD AUTO: 250 10(3)UL (ref 150–450)
RBC # BLD AUTO: 4.35 X10(6)UL
T VAGINALIS DNA VAG QL NAA+PROBE: NEGATIVE
WBC # BLD AUTO: 7.1 X10(3) UL (ref 4–11)

## 2025-02-06 PROCEDURE — 85027 COMPLETE CBC AUTOMATED: CPT | Performed by: ADVANCED PRACTICE MIDWIFE

## 2025-02-06 PROCEDURE — 36415 COLL VENOUS BLD VENIPUNCTURE: CPT | Performed by: ADVANCED PRACTICE MIDWIFE

## 2025-02-06 PROCEDURE — 84702 CHORIONIC GONADOTROPIN TEST: CPT | Performed by: ADVANCED PRACTICE MIDWIFE

## 2025-02-07 ENCOUNTER — TELEPHONE (OUTPATIENT)
Dept: OBGYN CLINIC | Facility: CLINIC | Age: 35
End: 2025-02-07

## 2025-02-07 ENCOUNTER — OFFICE VISIT (OUTPATIENT)
Dept: OBGYN CLINIC | Facility: CLINIC | Age: 35
End: 2025-02-07

## 2025-02-07 ENCOUNTER — HOSPITAL ENCOUNTER (OUTPATIENT)
Dept: ULTRASOUND IMAGING | Facility: HOSPITAL | Age: 35
Discharge: HOME | End: 2025-02-07
Attending: ADVANCED PRACTICE MIDWIFE
Payer: COMMERCIAL

## 2025-02-07 VITALS
WEIGHT: 177 LBS | HEIGHT: 63 IN | SYSTOLIC BLOOD PRESSURE: 122 MMHG | DIASTOLIC BLOOD PRESSURE: 79 MMHG | BODY MASS INDEX: 31.36 KG/M2 | HEART RATE: 101 BPM

## 2025-02-07 DIAGNOSIS — O36.80X0 PREGNANCY WITH INCONCLUSIVE FETAL VIABILITY, SINGLE OR UNSPECIFIED FETUS (HCC): Primary | ICD-10-CM

## 2025-02-07 DIAGNOSIS — O20.9 BLEEDING IN EARLY PREGNANCY (HCC): ICD-10-CM

## 2025-02-07 DIAGNOSIS — O20.9 BLEEDING IN EARLY PREGNANCY (HCC): Primary | ICD-10-CM

## 2025-02-07 PROCEDURE — 99213 OFFICE O/P EST LOW 20 MIN: CPT | Performed by: ADVANCED PRACTICE MIDWIFE

## 2025-02-07 PROCEDURE — 76817 TRANSVAGINAL US OBSTETRIC: CPT | Performed by: ADVANCED PRACTICE MIDWIFE

## 2025-02-07 PROCEDURE — 76801 OB US < 14 WKS SINGLE FETUS: CPT | Performed by: ADVANCED PRACTICE MIDWIFE

## 2025-02-07 RX ORDER — PRENATAL VIT/IRON FUM/FOLIC AC 27MG-0.8MG
1 TABLET ORAL DAILY
COMMUNITY

## 2025-02-07 NOTE — TELEPHONE ENCOUNTER
Patient saw Ambar on 2/5 and thinks she miscarried on Monday. Had a repeat HCG done yesterday. Would like to know if she needs to keep her 9am Nurse education appointment for today based on those results. Please call.    Sent as high priority due to timing.

## 2025-02-07 NOTE — TELEPHONE ENCOUNTER
7 weeks 6 days gestation. Pt feels she is miscarrying. See 25 phone message message. Pt saw Ambar on 25 and another Hcg was ordered. Pt requesting results from that Hcg and recommendations from provider. Pt is still having mild bleeding and feels she passed the POC yesterday.    "The patient's care was discussed with the treatment team during the daily team meeting and/or staff's chart notes were reviewed. Staff report patient slept 6.75 hours with no behavioral concerns.  Evening staff report: Pt had a decent evening shift. Was visible in the lounge for most of the shift. Was withdrawn to himself but did attend the OT group. Presented with flat affect and calm mood. Denied depressive symptoms and anxiety. Denied auditory and visual hallucinations. Denied SI/HI. No paranoid or delusional statement noted. Did not appear to be responding. Pt said that he is waiting for the [facility] to call him. Pt ate dinner in the lounge. Presented as fairly groomed. Took his medications as prescribed. No behavioral outburst.   Plan: Continue to provide safe environment and therapeutic milieu.        Upon interview, patient was interviewed in the conference room with his CM (Yamila) present. Patient appears calm, cheerful, and cooperative. He describes his mood as \"stable.\"  Patient immediately and enthusiastically reports that he has an interview coming up this afternoon at 1 pm. Patient states that he is optimistic,  believing things would go well. Yamila discussed about the court that should have happened yesterday, she addressed the patient's concerns re absentism assuring him that she will advocate for him that he cannot possibly attend a court hearing while in the hospital.   Patient reports that his medications are working well with no side effects, he denies A/VH, SI/HI and thoughts of self harm.            Allergies:      Allergies             Allergies   Allergen Reactions    Haloperidol Confusion and Other (See Comments)       Prone to EPSE. COnsider IM Zyprexa or be sure to give with benadryl              Labs:      Recent Results             Recent Results (from the past 336 hour(s))   WBC and Differential     Collection Time: 05/01/24  9:12 AM   Result Value Ref Range     WBC Count 5.7 4.0 - 11.0 " 10e3/uL     % Neutrophils 51 %     % Lymphocytes 38 %     % Monocytes 8 %     % Eosinophils 2 %     % Basophils 1 %     % Immature Granulocytes 0 %     NRBCs per 100 WBC 0 <1 /100     Absolute Neutrophils 2.9 1.6 - 8.3 10e3/uL     Absolute Lymphocytes 2.1 0.8 - 5.3 10e3/uL     Absolute Monocytes 0.4 0.0 - 1.3 10e3/uL     Absolute Eosinophils 0.1 0.0 - 0.7 10e3/uL     Absolute Basophils 0.0 0.0 - 0.2 10e3/uL     Absolute Immature Granulocytes 0.0 <=0.4 10e3/uL     Absolute NRBCs 0.0 10e3/uL   WBC and Differential     Collection Time: 05/08/24  8:07 AM   Result Value Ref Range     WBC Count 5.0 4.0 - 11.0 10e3/uL     % Neutrophils 41 %     % Lymphocytes 49 %     % Monocytes 7 %     % Eosinophils 2 %     % Basophils 1 %     % Immature Granulocytes 0 %     NRBCs per 100 WBC 0 <1 /100     Absolute Neutrophils 2.0 1.6 - 8.3 10e3/uL     Absolute Lymphocytes 2.4 0.8 - 5.3 10e3/uL     Absolute Monocytes 0.4 0.0 - 1.3 10e3/uL     Absolute Eosinophils 0.1 0.0 - 0.7 10e3/uL     Absolute Basophils 0.0 0.0 - 0.2 10e3/uL     Absolute Immature Granulocytes 0.0 <=0.4 10e3/uL     Absolute NRBCs 0.0 10e3/uL              Psychiatric Examination:      /84 (BP Location: Left arm, Patient Position: Sitting, Cuff Size: Adult Regular)   Pulse 94   Temp 97.8  F (36.6  C) (Oral)   Resp 12   Ht 1.829 m (6')   Wt 107.1 kg (236 lb 1.6 oz)   SpO2 98%   BMI 32.02 kg/m    Weight is 236 lbs 1.6 oz  Body mass index is 32.02 kg/m .     Weight over time:          Vitals:     04/03/24 1151 04/09/24 0826   Weight: 108 kg (238 lb) 107.1 kg (236 lb 1.6 oz)         Orthostatic Vitals           Most Recent       Sitting Orthostatic /85 04/09 0826     Sitting Orthostatic Pulse (bpm) 73 04/09 0826     Standing Orthostatic /92 04/09 0826     Standing Orthostatic Pulse (bpm) 69 04/09 0826             Cardiometabolic risk assessment. 04/10/24     Reviewed patient profile for cardiometabolic risk factors     Date taken /Value  REFERENCE  "RANGE   Abdominal Obesity  (Waist Circumference)   See nursing flowsheet Women ?35 in (88 cm)   Men ?40 in (102 cm)       Triglycerides                Triglycerides   Date Value Ref Range Status   02/26/2013 71 0 - 150 mg/dL Final       Comment:       Fasting specimen         ?150 mg/dL (1.7 mmol/L) or current treatment for elevated triglycerides   HDL cholesterol            HDL Cholesterol   Date Value Ref Range Status   02/26/2013 45 40 - 110 mg/dL Final   ]    Women <50 mg/dL (1.3 mmol/L) in women or current treatment for low HDL cholesterol  Men <40 mg/dL (1 mmol/L) in men or current treatment for low HDL cholesterol      Fasting plasma glucose (FPG)           Lab Results   Component Value Date      04/08/2024     GLC 83 02/26/2013        FPG ?100 mg/dL (5.6 mmol/L) or treatment for elevated blood glucose   Blood pressure        BP Readings from Last 3 Encounters:   04/08/24 127/84   02/26/13 129/74    Blood pressure ?130/85 mmHg or treatment for elevated blood pressure   Family History  See family history      Mental Status Exam:  Appearance: awake, alert, dressed in a hospital scrubs, appeared as age stated  Attitude:  calm, cooperative  Eye Contact:  good  Mood: \"Stable\"  Affect: Cheerful, bright  Speech:  clear, normal tone and volume  Language: fluent and intact in English  Psychomotor, Gait, Musculoskeletal:  no evidence of tardive dyskinesia, dystonia, or tics  Throught Process: Linear, Logical, goal directed  Associations:  No Loosening of associations  Thought Content:  no evidence of suicidal ideation or homicidal ideation, no auditory hallucinations present, and no visual hallucinations present  Insight: True emotional awareness  Judgement: fair  Oriented to:  time, person, and place  Attention Span and Concentration: Good  Recent and Remote Memory: Intact  Fund of Knowledge: Appropriate             Precautions:                Behavioral Orders   Procedures    Assault precautions    Code 1 - " Restrict to Unit    Elopement precautions    [facility]       Until discharge, Extended Care to offer psychotherapeutic services to mental health patients boarding for admission or stabilization. These services are to include but are not limited to: individual psychotherapy, diagnostic assessment, case management and care planning, safety planning, etc. This may include up to 1 visit per day. If patient is physically located at [facility] or [facility], group psychotherapy up to 2 time per day may be offered.     [facility]       Until discharge, Extended Care to offer psychotherapeutic services to mental health patients boarding for admission or stabilization. These services are to include but are not limited to: individual psychotherapy, diagnostic assessment, case management and care planning, safety planning, etc. This may include up to 1 visit per day. If patient is physically located at [facility] or [facility], group psychotherapy up to 2 time per day may be offered.     Routine Programming       As clinically indicated    Sexual precautions    Status 15       Every 15 minutes.                Assault risk     -When following observed, team will review discontinuation of SIO:     When patient is not longer aggressive, sexually inappropriate or intrusive.       Order Specific Question:   CONTINUOUS 24 hours / day       Answer:   Other       Order Specific Question:   Specify distance       Answer:   10 foot       Order Specific Question:   Indications for SIO       Answer:   Assault risk       Order Specific Question:   Indications for SIO       Answer:   Severe intrusiveness    Suicide precautions: Suicide Risk: HIGH; Clinical rationale to override score: response to medication       Patients on Suicide Precautions should have a Combination Diet ordered that includes a Diet selection(s) AND a Behavioral Tray selection for Safe Tray - with utensils, or Safe Tray - NO utensils          Order Specific Question:    Suicide Risk       Answer:   HIGH       Order Specific Question:   Clinical rationale to override score:       Answer:   response to medication           Diagnoses:         Schizoaffective disorder, bipolar type (H)  Polysubstance abuse (H)  Agitation     Clinically Significant Risk Factors                          # Obesity: Estimated body mass index is 32.02 kg/m  as calculated from the following:    Height as of this encounter: 1.829 m (6').    Weight as of this encounter: 107.1 kg (236 lb 1.6 oz)., PRESENT ON ADMISSION     # Financial/Environmental Concerns: other (see comments) (lost IRTS housing)     # Housing Instability: noted in nursing assessment          Assessment & Plan:      Assessment and hospital summary:  Ward Dawson is a -34- old male with a previous diagnosis of Schizoaffective disorder bipolar type who presented to the ED for a significant behavioral change, verbal agitation, worsening psychosocial stress, in the context of substance use.  Factors that make the mental health crisis life threatening or complex are:  Patient was brought to the ED from [facility] following his ECT treatment this morning at [facility].  Patient states he does not know why he is here and also states he knows why he is here.  He presents as manic, disorganized, and confused.   He is distractable and not a clear historian at this time.  Patient states he has not slept in a long time and received narcan last night.  Per ACT team  and [facility] staff patient has been elevated, intrusive, and disruptive.  Patient broke a window yesterday.  [facility] reports patient had turned table over and tried using them as skate board ramps.  Patient was noted for have been fairly stable prior to his pass this weekend.  CM reports patient went to a skateboarding competition in [place].  Patient returned in a manic state.  CM reports pt may not appear as manic as he has been the past few days due to sedation and ECT this morning.   She reports patient has not slept in 3 days.  IRTS reports pt received narcan twice last night.  Patient has been trespassed from the IRTS facility.  UDS is postive for cannabis..         Today's Changes: 5/10/24  No changes made to medications     Target psychiatric symptoms and interventions:  Admitted on 4/9/24 to station 12N  Clozaril, 50 mg every morning and 200 mg at bedtime  --Depakote ER, 500 mg daily and 1000 mg at bedtime for mood stabilization   --Gabapentin, 900 mg twice a day for pain  - Ibuprofen tablet 400 mg orally every 6 hours prn for pain  --Additional medications will include B52, Zyprexa, trazodone, hydroxyzine     Risks, benefits, and alternatives discussed at length with patient.      Acute Medical Problems and Treatments:  Acute medical concerns:  - No acute medical concerns     Pertinent labs/imaging:  Recent Results      Recent Results             Recent Results (from the past 336 hour(s))   WBC and Differential     Collection Time: 04/24/24  8:12 AM   Result Value Ref Range     WBC Count 5.1 4.0 - 11.0 10e3/uL     % Neutrophils 47 %     % Lymphocytes 42 %     % Monocytes 9 %     % Eosinophils 1 %     % Basophils 1 %     % Immature Granulocytes 0 %     NRBCs per 100 WBC 0 <1 /100     Absolute Neutrophils 2.4 1.6 - 8.3 10e3/uL     Absolute Lymphocytes 2.1 0.8 - 5.3 10e3/uL     Absolute Monocytes 0.5 0.0 - 1.3 10e3/uL     Absolute Eosinophils 0.1 0.0 - 0.7 10e3/uL     Absolute Basophils 0.0 0.0 - 0.2 10e3/uL     Absolute Immature Granulocytes 0.0 <=0.4 10e3/uL     Absolute NRBCs 0.0 10e3/uL   Extra Green Top (Lithium Heparin) Tube     Collection Time: 04/24/24  8:12 AM   Result Value Ref Range     Hold Specimen [facility]     WBC and Differential     Collection Time: 05/01/24  9:12 AM   Result Value Ref Range     WBC Count 5.7 4.0 - 11.0 10e3/uL     % Neutrophils 51 %     % Lymphocytes 38 %     % Monocytes 8 %     % Eosinophils 2 %     % Basophils 1 %     % Immature Granulocytes 0 %     NRBCs per  100 WBC 0 <1 /100     Absolute Neutrophils 2.9 1.6 - 8.3 10e3/uL     Absolute Lymphocytes 2.1 0.8 - 5.3 10e3/uL     Absolute Monocytes 0.4 0.0 - 1.3 10e3/uL     Absolute Eosinophils 0.1 0.0 - 0.7 10e3/uL     Absolute Basophils 0.0 0.0 - 0.2 10e3/uL     Absolute Immature Granulocytes 0.0 <=0.4 10e3/uL     Absolute NRBCs 0.0 10e3/uL            Recent Results               Recent Results (from the past 336 hour(s))   Valproic acid     Collection Time: 04/05/24  9:39 PM   Result Value Ref Range     Valproic acid 68.4   ug/mL   Asymptomatic COVID-19 Virus (Coronavirus) by PCR Nasopharyngeal     Collection Time: 04/05/24  9:40 PM     Specimen: Nasopharyngeal; Swab   Result Value Ref Range     SARS CoV2 PCR Negative Negative   Glucose by meter     Collection Time: 04/08/24  8:21 PM   Result Value Ref Range     GLUCOSE BY METER POCT 117 (H) 70 - 99 mg/dL   WBC and Differential     Collection Time: 04/10/24 10:59 AM   Result Value Ref Range     WBC Count 5.7 4.0 - 11.0 10e3/uL     % Neutrophils 51 %     % Lymphocytes 37 %     % Monocytes 9 %     % Eosinophils 2 %     % Basophils 1 %     % Immature Granulocytes 0 %     NRBCs per 100 WBC 0 <1 /100     Absolute Neutrophils 3.0 1.6 - 8.3 10e3/uL     Absolute Lymphocytes 2.1 0.8 - 5.3 10e3/uL     Absolute Monocytes 0.5 0.0 - 1.3 10e3/uL     Absolute Eosinophils 0.1 0.0 - 0.7 10e3/uL     Absolute Basophils 0.0 0.0 - 0.2 10e3/uL     Absolute Immature Granulocytes 0.0 <=0.4 10e3/uL     Absolute NRBCs 0.0 10e3/uL   EKG 12-lead, complete     Collection Time: 04/15/24  9:20 AM   Result Value Ref Range     Systolic Blood Pressure   mmHg     Diastolic Blood Pressure   mmHg     Ventricular Rate 85 BPM     Atrial Rate 85 BPM     OR Interval 152 ms     QRS Duration 94 ms      ms     QTc 449 ms     P Axis 73 degrees     R AXIS 1 degrees     T Axis 29 degrees     Interpretation ECG           Sinus rhythm  No previous ECGs available  Confirmed by fellow William Chahal ([id number]) on  4/17/2024 9:31:06 AM  Confirmed by MD LILIAN, PENNY ([id number]) on 4/17/2024 10:20:26 PM      Comprehensive metabolic panel     Collection Time: 04/15/24  9:53 AM   Result Value Ref Range     Sodium 139 135 - 145 mmol/L     Potassium 4.2 3.4 - 5.3 mmol/L     Carbon Dioxide (CO2) 29 22 - 29 mmol/L     Anion Gap 10 7 - 15 mmol/L     Urea Nitrogen 18.6 6.0 - 20.0 mg/dL     Creatinine 0.97 0.67 - 1.17 mg/dL     GFR Estimate >90 >60 mL/min/1.73m2     Calcium 8.9 8.6 - 10.0 mg/dL     Chloride 100 98 - 107 mmol/L     Glucose 117 (H) 70 - 99 mg/dL     Alkaline Phosphatase 73 40 - 150 U/L     AST 31 0 - 45 U/L     ALT 28 0 - 70 U/L     Protein Total 7.2 6.4 - 8.3 g/dL     Albumin 4.5 3.5 - 5.2 g/dL     Bilirubin Total 0.7 <=1.2 mg/dL   CBC with platelets and differential     Collection Time: 04/15/24  9:53 AM   Result Value Ref Range     WBC Count 6.3 4.0 - 11.0 10e3/uL     RBC Count 5.09 4.40 - 5.90 10e6/uL     Hemoglobin 14.0 13.3 - 17.7 g/dL     Hematocrit 44.9 40.0 - 53.0 %     MCV 88 78 - 100 fL     MCH 27.5 26.5 - 33.0 pg     MCHC 31.2 (L) 31.5 - 36.5 g/dL     RDW 13.2 10.0 - 15.0 %     Platelet Count 234 150 - 450 10e3/uL     % Neutrophils 59 %     % Lymphocytes 33 %     % Monocytes 5 %     % Eosinophils 2 %     % Basophils 1 %     % Immature Granulocytes 0 %     NRBCs per 100 WBC 0 <1 /100     Absolute Neutrophils 3.8 1.6 - 8.3 10e3/uL     Absolute Lymphocytes 2.1 0.8 - 5.3 10e3/uL     Absolute Monocytes 0.3 0.0 - 1.3 10e3/uL     Absolute Eosinophils 0.1 0.0 - 0.7 10e3/uL     Absolute Basophils 0.0 0.0 - 0.2 10e3/uL     Absolute Immature Granulocytes 0.0 <=0.4 10e3/uL     Absolute NRBCs 0.0 10e3/uL   WBC and Differential     Collection Time: 04/17/24 12:48 PM   Result Value Ref Range     WBC Count 5.6 4.0 - 11.0 10e3/uL     % Neutrophils 68 %     % Lymphocytes 25 %     % Monocytes 5 %     % Eosinophils 1 %     % Basophils 1 %     % Immature Granulocytes 0 %     NRBCs per 100 WBC 0 <1 /100     Absolute Neutrophils 3.8 1.6  - 8.3 10e3/uL     Absolute Lymphocytes 1.4 0.8 - 5.3 10e3/uL     Absolute Monocytes 0.3 0.0 - 1.3 10e3/uL     Absolute Eosinophils 0.0 0.0 - 0.7 10e3/uL     Absolute Basophils 0.0 0.0 - 0.2 10e3/uL     Absolute Immature Granulocytes 0.0 <=0.4 10e3/uL     Absolute NRBCs 0.0 10e3/uL   Extra Green Top (Lithium Heparin) Tube     Collection Time: 04/17/24 12:48 PM   Result Value Ref Range     Hold Specimen [facility]                    Behavioral/Psychological/Social:  - Encourage unit programming     Safety  Placed on the following Precautions: Suicide, Assault, Elopement and Sexual precautions  - Continue precautions as noted above  -  2:1 staff acuity for intrusive, assaultive behaviors  - Status 15 minute checks  - Legal Status: voluntary     Disposition Plan   Reason for ongoing admission: poses an imminent risk to self and poses an imminent risk to others  Discharge location: IRTS facility or CD  Discharge Medications: not ordered  Follow-up Appointments: not scheduled     Entered by: Phan Sanchez DNP, APRN CNP on 05/10/2024  at 11:54 am                           Consent 3/Introductory Paragraph: I gave the patient a chance to ask questions they had about the procedure.  Following this I explained the Mohs procedure and consent was obtained. The risks, benefits and alternatives to therapy were discussed in detail. Specifically, the risks of infection, scarring, bleeding, prolonged wound healing, incomplete removal, allergy to anesthesia, nerve injury and recurrence were addressed. Prior to the procedure, the treatment site was clearly identified and confirmed by the patient. All components of Universal Protocol/PAUSE Rule completed.

## 2025-02-07 NOTE — PROGRESS NOTES
Chief Complaint   Patient presents with    Gyn Exam     Possible miscarriage         HPI:   Lainey is 34 year old , here today to follow-up on vaginal bleeding in early pregnancy. Was seen on  and labs sent. Pt was unable to attend ultrasound on that date so only completed repeat bHCG yesterday. Bleeding has continued like a menses, no further clots at this time. No pain.    Patient Active Problem List   Diagnosis    Obesity in pregnancy (HCC)    Vaginal bleeding affecting early pregnancy (HCC)    Uterine leiomyoma        Note: This is a gyn only visit. Pt has PCP and is referred back to PCP for care of any non-gyn concerns listed above in the Problem List.    Medications (Active prior to today's visit):  Current Outpatient Medications   Medication Sig Dispense Refill    ascorbic acid 1000 MG Oral Tab Take 1 tablet (1,000 mg total) by mouth daily.      Cholecalciferol (VITAMIN D3) 25 MCG (1000 UT) Oral Cap Take 1 tablet by mouth daily.      Prenatal 27-0.8 MG Oral Tab Take 1 tablet by mouth daily.         Allergies:  Allergies[1]    ROS:  Review of Systems   Constitutional: Negative.    Genitourinary:  Positive for vaginal bleeding (period-like bleeding).       PHYSICAL EXAM:  Vitals:    25 1156   BP: 122/79   Pulse: 101     Physical Exam  Vitals reviewed.   Constitutional:       Appearance: Normal appearance.   HENT:      Head: Normocephalic.   Pulmonary:      Effort: Pulmonary effort is normal.   Genitourinary:     Comments: Bimanual and labs completed at visit earlier this week. No physical changes since to warrant repeating pelvic exam today.  Neurological:      Mental Status: She is alert and oriented to person, place, and time.   Psychiatric:         Behavior: Behavior normal.         Thought Content: Thought content normal.         Judgment: Judgment normal.            Recent Results (from the past 24 hours)   HCG, Beta Subunit (Quant Pregnancy Test)    Collection Time: 25  6:28 PM    Result Value Ref Range    Hcg Quantitative 2,353.3 (H) <=4.2 mIU/mL   CBC, Platelet; No Differential    Collection Time: 02/06/25  6:28 PM   Result Value Ref Range    WBC 7.1 4.0 - 11.0 x10(3) uL    RBC 4.35 3.80 - 5.30 x10(6)uL    HGB 13.5 12.0 - 16.0 g/dL    HCT 40.2 35.0 - 48.0 %    MCV 92.4 80.0 - 100.0 fL    MCH 31.0 26.0 - 34.0 pg    MCHC 33.6 31.0 - 37.0 g/dL    RDW 13.0 11.0 - 15.0 %    RDW-SD 44.2 35.1 - 46.3 fL    .0 150.0 - 450.0 10(3)uL      2/4/25: bHCG 2,446  2/6/25: bHCG 2,353  O positive  No ultrasound yet     ASSESSMENT/PLAN:     Lainey was seen today for gyn exam.    Diagnoses and all orders for this visit:    Bleeding in early pregnancy (HCC)  -     US PREG 1ST TRIMESTER (CPT=76801); Future       Stat/hold/call ordered. Plan & follow-up pending those results    I have spent 20 minutes total time on the day of the encounter, including: preparing to see the patient, ordering/reviewing labs, performing a medically appropriate exam, and providing care coordination. face to face counseling, chart review, orders and coordination of care    Patient verbalized understanding, All questions answered. No barriers to learning identified         [1] Not on File

## 2025-02-08 ENCOUNTER — HOSPITAL ENCOUNTER (EMERGENCY)
Facility: HOSPITAL | Age: 35
Discharge: HOME | End: 2025-02-09
Payer: COMMERCIAL

## 2025-02-08 ENCOUNTER — LAB ENCOUNTER (OUTPATIENT)
Dept: LAB | Age: 35
End: 2025-02-08
Attending: ADVANCED PRACTICE MIDWIFE
Payer: COMMERCIAL

## 2025-02-08 ENCOUNTER — MOBILE ENCOUNTER (OUTPATIENT)
Dept: OBGYN CLINIC | Facility: CLINIC | Age: 35
End: 2025-02-08

## 2025-02-08 DIAGNOSIS — O02.89 NON-VIABLE PREGNANCY (HCC): Primary | ICD-10-CM

## 2025-02-08 LAB
B-HCG SERPL-ACNC: 2400.6 MIU/ML
BASOPHILS # BLD AUTO: 0.04 X10(3) UL (ref 0–0.2)
BASOPHILS NFR BLD AUTO: 0.5 %
DEPRECATED RDW RBC AUTO: 43.9 FL (ref 35.1–46.3)
EOSINOPHIL # BLD AUTO: 0.32 X10(3) UL (ref 0–0.7)
EOSINOPHIL NFR BLD AUTO: 4.1 %
ERYTHROCYTE [DISTWIDTH] IN BLOOD BY AUTOMATED COUNT: 13.2 % (ref 11–15)
HCT VFR BLD AUTO: 39.5 %
HGB BLD-MCNC: 13.5 G/DL
IMM GRANULOCYTES # BLD AUTO: 0.01 X10(3) UL (ref 0–1)
IMM GRANULOCYTES NFR BLD: 0.1 %
LYMPHOCYTES # BLD AUTO: 3.12 X10(3) UL (ref 1–4)
LYMPHOCYTES NFR BLD AUTO: 40.1 %
MCH RBC QN AUTO: 31.4 PG (ref 26–34)
MCHC RBC AUTO-ENTMCNC: 34.2 G/DL (ref 31–37)
MCV RBC AUTO: 91.9 FL
MONOCYTES # BLD AUTO: 0.49 X10(3) UL (ref 0.1–1)
MONOCYTES NFR BLD AUTO: 6.3 %
NEUTROPHILS # BLD AUTO: 3.8 X10 (3) UL (ref 1.5–7.7)
NEUTROPHILS # BLD AUTO: 3.8 X10(3) UL (ref 1.5–7.7)
NEUTROPHILS NFR BLD AUTO: 48.9 %
PLATELET # BLD AUTO: 247 10(3)UL (ref 150–450)
RBC # BLD AUTO: 4.3 X10(6)UL
WBC # BLD AUTO: 7.8 X10(3) UL (ref 4–11)

## 2025-02-08 PROCEDURE — 85025 COMPLETE CBC W/AUTO DIFF WBC: CPT | Performed by: EMERGENCY MEDICINE

## 2025-02-08 PROCEDURE — 36415 COLL VENOUS BLD VENIPUNCTURE: CPT | Performed by: ADVANCED PRACTICE MIDWIFE

## 2025-02-08 PROCEDURE — 36415 COLL VENOUS BLD VENIPUNCTURE: CPT

## 2025-02-08 PROCEDURE — 99285 EMERGENCY DEPT VISIT HI MDM: CPT

## 2025-02-08 PROCEDURE — 84702 CHORIONIC GONADOTROPIN TEST: CPT | Performed by: EMERGENCY MEDICINE

## 2025-02-08 PROCEDURE — 99283 EMERGENCY DEPT VISIT LOW MDM: CPT

## 2025-02-08 PROCEDURE — 96372 THER/PROPH/DIAG INJ SC/IM: CPT

## 2025-02-08 PROCEDURE — 84702 CHORIONIC GONADOTROPIN TEST: CPT | Performed by: ADVANCED PRACTICE MIDWIFE

## 2025-02-08 NOTE — TELEPHONE ENCOUNTER
Phone call to patient to discuss ultrasound results. Pt reports last night she passed what she thought was tissue. Today bleeding is lighter, like a period. Denies any clots or cramping/pain. Patient status and ultrasound findings discussed with Dr. Tao. Plan for patient to repeat HCG level tomorrow. If HCG is not significantly dropped (around 50%) tomorrow she will need to go to ER for methotrexate. Discussed recommendation and plan with patient. Discussed ectopic pregnancy warning signs and dangers in depth with pt. Pt verbalized understanding and agreement with instructions and plan.

## 2025-02-09 ENCOUNTER — APPOINTMENT (OUTPATIENT)
Dept: ULTRASOUND IMAGING | Facility: HOSPITAL | Age: 35
End: 2025-02-09
Attending: NURSE PRACTITIONER
Payer: COMMERCIAL

## 2025-02-09 ENCOUNTER — TELEPHONE (OUTPATIENT)
Dept: OBGYN CLINIC | Facility: CLINIC | Age: 35
End: 2025-02-09

## 2025-02-09 VITALS
TEMPERATURE: 98 F | HEART RATE: 88 BPM | DIASTOLIC BLOOD PRESSURE: 81 MMHG | SYSTOLIC BLOOD PRESSURE: 127 MMHG | OXYGEN SATURATION: 98 % | RESPIRATION RATE: 16 BRPM

## 2025-02-09 LAB
ALBUMIN SERPL-MCNC: 4.3 G/DL (ref 3.2–4.8)
ALBUMIN/GLOB SERPL: 1.7 {RATIO} (ref 1–2)
ALP LIVER SERPL-CCNC: 89 U/L
ALT SERPL-CCNC: 22 U/L
ANION GAP SERPL CALC-SCNC: 6 MMOL/L (ref 0–18)
AST SERPL-CCNC: 23 U/L (ref ?–34)
B-HCG SERPL-ACNC: 2267.5 MIU/ML
BILIRUB SERPL-MCNC: 0.8 MG/DL (ref 0.3–1.2)
BUN BLD-MCNC: 7 MG/DL (ref 9–23)
BUN/CREAT SERPL: 9.9 (ref 10–20)
CALCIUM BLD-MCNC: 9 MG/DL (ref 8.7–10.4)
CHLORIDE SERPL-SCNC: 106 MMOL/L (ref 98–112)
CO2 SERPL-SCNC: 26 MMOL/L (ref 21–32)
CREAT BLD-MCNC: 0.71 MG/DL
EGFRCR SERPLBLD CKD-EPI 2021: 116 ML/MIN/1.73M2 (ref 60–?)
GLOBULIN PLAS-MCNC: 2.6 G/DL (ref 2–3.5)
GLUCOSE BLD-MCNC: 104 MG/DL (ref 70–99)
OSMOLALITY SERPL CALC.SUM OF ELEC: 284 MOSM/KG (ref 275–295)
POTASSIUM SERPL-SCNC: 4.1 MMOL/L (ref 3.5–5.1)
PROT SERPL-MCNC: 6.9 G/DL (ref 5.7–8.2)
SODIUM SERPL-SCNC: 138 MMOL/L (ref 136–145)

## 2025-02-09 PROCEDURE — 76801 OB US < 14 WKS SINGLE FETUS: CPT | Performed by: NURSE PRACTITIONER

## 2025-02-09 PROCEDURE — 80053 COMPREHEN METABOLIC PANEL: CPT | Performed by: EMERGENCY MEDICINE

## 2025-02-09 PROCEDURE — 76817 TRANSVAGINAL US OBSTETRIC: CPT | Performed by: NURSE PRACTITIONER

## 2025-02-09 NOTE — TELEPHONE ENCOUNTER
I spoke with patient's primary gynecologic provider, Nirmala Lopez CNM to review this case. Briefly, Lainey is a 35yo  at approximately 7wga by LMP presenting for management of suspected ectopic pregnancy. bHCG trend has plateaued around  over the last 3 measurements. I personally reviewed images from [facility] on  which appear to demonstrate a pseudogestational sac. No IUP or ectopic pregnancy visualized. Given bHCG trend and no visualized IUP, findings are highly suspicious for ectopic pregnancy and patient was offered methotrexate.     I was called to speak with the patient when she presented to the ED today for treatment. Patient feels she has received conflicting information and desires additional workup to definitively demonstrate ectopic pregnancy. Advised patient that bHCG trend alone is sufficient for diagnosis, ectopic pregnancy is likely too small to be visualized at this time. Discussed risks of methotrexate and need to defer subsequent pregnancy for at least 3 months after treatment. Discussed treatment follow up with either Kalpesh or in our office. Advised patient that no additional workup is necessary. Patient voiced understanding.     Ying Goodwin,

## 2025-02-09 NOTE — ED PROVIDER NOTES
Patient with suspected ectopic pregnancy, patient requesting repeat ultrasound.  Case discussed with OB by APN, OB advises methotrexate.  Discussed with patient at the bedside.      ultrasound pelvis/OB    Comparison: 2/7/2025    IMPRESSION:  No change from prior exam    Possible small gestational sac within the endometrium  No yolk sac or fetal pole visualized  In this context, findings suspicious for blighted ovum    No suspicious adnexal mass.  No pelvic free fluid    Suspected corpus luteum in the left ovary.  Otherwise the ovaries appear normal    Several uterine fibroids, largest measuring 4.4 cm

## 2025-02-09 NOTE — PROGRESS NOTES
Phone call to patient to review hcg results. Discussed concern for ectopic pregnancy and recommendation for treatment with methotrexate in the ED. Discussed risk of ruptured fallopian tube and internal bleeding with ectopic pregnancy. Patient asked about waiting longer and repeating ultrasound in one week. Advised against this given risks of ectopic. Informed her that pregnancy is not viable based on hcg levels that dropped and then barely increased and size of gestational sac with sure lmp. Patient desires to talk to family and then call back. Instructed her how to page CNM on call.

## 2025-02-09 NOTE — ED QUICK NOTES
Patient to Ed for suspected ectopic pregnancy. Patient moderately bleeding bright red, not saturating pads. Has mild to moderate cramping. Reports lower back pain. No highhandedness or dizziness. Patient is alert and oriented x4 and in no active distress.

## 2025-02-09 NOTE — PROGRESS NOTES
Patient called back and states she and her  are on their way to the [place] ED. ED called to inform them of patients impending arrival. Also spoke with Dr. Goodwin earlier who is in agreement with plan and aware patient likely going to ED.

## 2025-02-09 NOTE — DISCHARGE INSTRUCTIONS
Call Nirmala Lopez tomorrow for a schedule of follow up lab and imaging as warranted  Return to the ED for new or worsening symptoms

## 2025-02-11 ENCOUNTER — TELEPHONE (OUTPATIENT)
Dept: OBGYN CLINIC | Facility: CLINIC | Age: 35
End: 2025-02-11

## 2025-02-11 DIAGNOSIS — O00.90 ECTOPIC PREGNANCY WITHOUT INTRAUTERINE PREGNANCY, UNSPECIFIED LOCATION (HCC): Primary | ICD-10-CM

## 2025-02-11 NOTE — TELEPHONE ENCOUNTER
Patient seen at [facility] 2/8 for ectopic pregnancy. Received Methotrexate. Discharge instructions were to repeat HCG levels. Patient wanted to know if order could be placed or does she need to be seen. Please call.

## 2025-02-11 NOTE — TELEPHONE ENCOUNTER
LMTCB     8w 3d gestation. Pt seen in ER on 25 for ectopic pregnancy.Methotrexate 25 mg x 1 was given at this visit. Dr. Ying Goodwin called pt on 25 and reviewed plan of care with pt. Attempted to call pt and follow up on how she is doing, but received pt's voicemail. Does pt need follow up Hcg levels?

## 2025-02-12 ENCOUNTER — TELEPHONE (OUTPATIENT)
Dept: OBGYN CLINIC | Facility: CLINIC | Age: 35
End: 2025-02-12

## 2025-02-12 NOTE — TELEPHONE ENCOUNTER
Attempted to call pt to follow up. Went to voicemail however mailbox full and unable to leave message.     RN's please try to call pt again. She needs repeat HCG today and then on Saturday. She also needs appt with MD's. I have sent message to the nurses in the MD office to get her scheduled so she should be hearing from them probably tomorrow. Thanks MJ

## 2025-02-12 NOTE — TELEPHONE ENCOUNTER
Pt verified name and .     Informed pt of recommendation to repeat hCG as well as recommendation for follow up with [facility] physician's office. Pt verbalized understanding and agreed. Pt states she is unable to complete hCG today, but will have hCG completed tomorrow morning prior to scheduled appointment with [facility] office.

## 2025-02-12 NOTE — TELEPHONE ENCOUNTER
She should have follow up with MD's, yes she will need follow up HCG levels with them. Ectopic follow up managed by MD's. Thanks

## 2025-02-13 ENCOUNTER — TELEPHONE (OUTPATIENT)
Dept: OBGYN CLINIC | Facility: CLINIC | Age: 35
End: 2025-02-13

## 2025-02-13 ENCOUNTER — LAB ENCOUNTER (OUTPATIENT)
Dept: LAB | Facility: HOSPITAL | Age: 35
End: 2025-02-13
Attending: OBSTETRICS & GYNECOLOGY
Payer: COMMERCIAL

## 2025-02-13 ENCOUNTER — OFFICE VISIT (OUTPATIENT)
Dept: OBGYN CLINIC | Facility: CLINIC | Age: 35
End: 2025-02-13
Payer: COMMERCIAL

## 2025-02-13 VITALS
SYSTOLIC BLOOD PRESSURE: 122 MMHG | DIASTOLIC BLOOD PRESSURE: 76 MMHG | BODY MASS INDEX: 30.83 KG/M2 | HEIGHT: 63 IN | WEIGHT: 174 LBS

## 2025-02-13 DIAGNOSIS — N96 RECURRENT PREGNANCY LOSS: ICD-10-CM

## 2025-02-13 DIAGNOSIS — N96 RECURRENT PREGNANCY LOSS: Primary | ICD-10-CM

## 2025-02-13 LAB — B-HCG SERPL-ACNC: 1512.4 MIU/ML

## 2025-02-13 PROCEDURE — 88237 TISSUE CULTURE BONE MARROW: CPT

## 2025-02-13 PROCEDURE — 84702 CHORIONIC GONADOTROPIN TEST: CPT | Performed by: ADVANCED PRACTICE MIDWIFE

## 2025-02-13 PROCEDURE — 99203 OFFICE O/P NEW LOW 30 MIN: CPT | Performed by: OBSTETRICS & GYNECOLOGY

## 2025-02-13 PROCEDURE — 88262 CHROMOSOME ANALYSIS 15-20: CPT

## 2025-02-13 PROCEDURE — 36415 COLL VENOUS BLD VENIPUNCTURE: CPT | Performed by: ADVANCED PRACTICE MIDWIFE

## 2025-02-13 NOTE — TELEPHONE ENCOUNTER
RN called patient and informed to have partner call office to create an account so orders can be placed for the semen analysis. Pt verbalized understanding.

## 2025-02-13 NOTE — PROGRESS NOTES
GYNE PROBLEM VISIT     HPI:   Lainey Motley is a 34 year old  female presenting for follow up of ectopic pregnancy.     Patient received methotrexate on  for treatment of ectopic pregnancy diagnosed by abnormal bHCG trend and lack of IUP on ultrasound. She feels well today. No further vaginal bleeding. No abdominal pain.     She is interested in further workup to determine why she continues to have early pregnancy losses.     She has a known history of fibroids. Her menses have always been regular. Recently she has been bleeding for a few more days than she did in her youth but overall it is not excessive or painful.     Past Medical History:    Dysmenorrhea    Fibroids       History reviewed. No pertinent surgical history.    Family History   Problem Relation Age of Onset    Diabetes Father        Medications (Active prior to today's visit):  Current Outpatient Medications   Medication Sig Dispense Refill    ascorbic acid 1000 MG Oral Tab Take 1 tablet (1,000 mg total) by mouth daily.      Cholecalciferol (VITAMIN D3) 25 MCG (1000 UT) Oral Cap Take 1 tablet by mouth daily.      Prenatal 27-0.8 MG Oral Tab Take 1 tablet by mouth daily.         Allergies:  Allergies[1]    /76   Ht 63\"   Wt 174 lb (78.9 kg)   LMP 2024 (Exact Date)   BMI 30.82 kg/m²     PHYSICAL EXAM:   GENERAL: Well developed, well nourished, in no apparent distress  EXTREMITIES: nontender without edema     Pelvic Ultrasound 2025  INDICATIONS: Vaginal bleeding, declining HCG     TECHNIQUE: Transabdominal and endovaginal pelvic ultrasound examinations were performed.  A transvaginal scan was performed.     FINDINGS:     UTERUS: The uterus measures 8.5 x 5.2 x 7.5 cm. An intrauterine pregnancy is not seen. Ill-defined fluid is seen within the endometrial cavity measuring 0.5 x 0.9 x 0.6 cm. A fetal pole or yolk sac is not definitively visualized.  Multiple myometrial  uterine fibroids, the largest measuring 4.2 cm within  the posterior right body and 4.4 cm along the fundus..     RIGHT OVARY: 3.5 x 1.3 x 1.8 cm.       LEFT OVARY: 3.3 x 1.6 x 3.2 cm.       CUL-DE-SAC: Trace free fluid.                    Impression   CONCLUSION:     No intrauterine pregnancy is identified. Although this may represent an early pregnancy, an ectopic pregnancy or abnormal pregnancy and/or spontaneous  is not excluded. Serial beta hCGs are recommended with possible serial followup ultrasound per   clinical discretion.     Fluid collection within the endometrium may represent an early gestational sac versus a pseudo-gestational sac.     Overall appearance is similar to 2025.     Multiple uterine fibroids.     Component      Latest Ref Rng 2025   HCG QUANTITATIVE      <=4.2 mIU/mL 2,446.0 (H)  2,353.3 (H)  2,267.5 (H)    HCG QUANTITATIVE         2,400.6 (H)          ASSESSMENT/PLAN:       #Ectopic pregnancy  -s/p methotrexate on   -[facility] ordered for Day 4 and Day 7, will assess trend  -reviewed side effects of methotrexate  -reviewed signs/symptoms of ectopic rupture and return precautions  -discussed lack of quality data to guide fertility planning after methotrexate. Recommend avoiding pregnancy x3-6 months after treatment  -discussed that ectopic pregnancy is more common in patients with prior ectopic pregnancy, prior pelvic infection or scarring. She does have a hx of chlamydia which was treated last year     #Recurrent pregnancy loss   -history of 2 chemical pregnancies and ectopic pregnancy  -reviewed ultrasound findings and personally reviewed ultrasound images from most recent scan myself. She has multiple subserosal and intramural fibroids, largest 4cm posterior. In the sagittal views this fibroid appears to not have a submucosal component and is unlikely to be the cause of recurrent losses  -discussed possibility of genetic abnormality. Offered genetic counseling prior to testing vs carrier screening today,  patient would like to screen ASAP. Foresight kit completed. Will also check karyotype for possible translocation. Also recommend partner semen analysis.   -if workup negative, will re-assess fibroids for possible need to resect any submucosal component, otherwise will refer to [facility]    Follow up pending methotrexate surveillance      Ying Goodwin DO               [1] No Known Allergies

## 2025-02-17 ENCOUNTER — TELEPHONE (OUTPATIENT)
Dept: OBGYN CLINIC | Facility: CLINIC | Age: 35
End: 2025-02-17

## 2025-02-17 ENCOUNTER — LAB ENCOUNTER (OUTPATIENT)
Dept: LAB | Age: 35
End: 2025-02-17
Attending: OBSTETRICS & GYNECOLOGY
Payer: COMMERCIAL

## 2025-02-17 DIAGNOSIS — O00.90 ECTOPIC PREGNANCY WITHOUT INTRAUTERINE PREGNANCY, UNSPECIFIED LOCATION (HCC): Primary | ICD-10-CM

## 2025-02-17 NOTE — TELEPHONE ENCOUNTER
Incoming call from patient requesting HCG labs that Dr. Goodwin has placed on previous visit. At this time there are not orders.     Please place order if needed.

## 2025-02-17 NOTE — TELEPHONE ENCOUNTER
----- Message from Ying Goodwin sent at 2/17/2025 11:58 AM CST -----  This is a CNM patient that I've been seeing for management of ectopic pregnancy, she received methotrexate on 2/9. Can you please make sure she has a repeat beta ordered for Sunday 2/16 and remind her to get this done?    Left message to call back    Collected 2/18/2025 10:13 AM       Status: Final result       Dx: Ectopic pregnancy without intrauterin...    0 Result Notes      Component  Ref Range & Units 2/18/25 10:13 AM   Hcg Quantitative  <=4.2 mIU/mL 682.4 High

## 2025-02-18 ENCOUNTER — TELEPHONE (OUTPATIENT)
Dept: OBGYN UNIT | Facility: HOSPITAL | Age: 35
End: 2025-02-18

## 2025-02-18 ENCOUNTER — LAB ENCOUNTER (OUTPATIENT)
Dept: LAB | Age: 35
End: 2025-02-18
Attending: OBSTETRICS & GYNECOLOGY
Payer: COMMERCIAL

## 2025-02-18 DIAGNOSIS — N96 RECURRENT PREGNANCY LOSS: ICD-10-CM

## 2025-02-18 DIAGNOSIS — O00.90 ECTOPIC PREGNANCY WITHOUT INTRAUTERINE PREGNANCY, UNSPECIFIED LOCATION (HCC): ICD-10-CM

## 2025-02-18 LAB — B-HCG SERPL-ACNC: 682.4 MIU/ML

## 2025-02-18 PROCEDURE — 84443 ASSAY THYROID STIM HORMONE: CPT

## 2025-02-18 PROCEDURE — 36415 COLL VENOUS BLD VENIPUNCTURE: CPT

## 2025-02-18 PROCEDURE — 84702 CHORIONIC GONADOTROPIN TEST: CPT

## 2025-02-19 DIAGNOSIS — N96 RECURRENT PREGNANCY LOSS: Primary | ICD-10-CM

## 2025-02-19 LAB — TSI SER-ACNC: 0.65 UIU/ML (ref 0.55–4.78)

## 2025-02-24 LAB
CELLS ANALYZED BLDCHR: 20
CELLS COUNTED BLDCHR: 20
CELLS KARYOTYPED BLDCHR: 2
GTG BAND BLDCHR: 500

## 2025-03-04 ENCOUNTER — TELEPHONE (OUTPATIENT)
Dept: OBGYN CLINIC | Facility: CLINIC | Age: 35
End: 2025-03-04

## 2025-03-04 PROBLEM — Z14.8 GENETIC CARRIER STATUS: Status: ACTIVE | Noted: 2025-03-04

## 2025-03-05 ENCOUNTER — TELEPHONE (OUTPATIENT)
Dept: OBGYN CLINIC | Facility: CLINIC | Age: 35
End: 2025-03-05

## 2025-03-05 NOTE — TELEPHONE ENCOUNTER
Ying Goodwin,   [facility] Ob Clinical Staff  Patient was treated for ectopic with methotrexate, needs to repeat beta ASAP to ensure it resolved. Please have her do this at [facility] if possible as she also needs APLS labs as part of her recurrent pregnancy loss workup.    My chart message sent.    Left message explaining above.

## 2025-03-07 ENCOUNTER — LAB ENCOUNTER (OUTPATIENT)
Dept: LAB | Age: 35
End: 2025-03-07
Attending: OBSTETRICS & GYNECOLOGY
Payer: COMMERCIAL

## 2025-03-07 DIAGNOSIS — N96 RECURRENT PREGNANCY LOSS: ICD-10-CM

## 2025-03-07 DIAGNOSIS — O00.90 ECTOPIC PREGNANCY WITHOUT INTRAUTERINE PREGNANCY, UNSPECIFIED LOCATION (HCC): ICD-10-CM

## 2025-03-07 LAB
B-HCG SERPL-ACNC: <2.6 MIU/ML
PROLACTIN SERPL-MCNC: 15.5 NG/ML

## 2025-03-07 PROCEDURE — 84702 CHORIONIC GONADOTROPIN TEST: CPT

## 2025-03-07 PROCEDURE — 85610 PROTHROMBIN TIME: CPT

## 2025-03-07 PROCEDURE — 84146 ASSAY OF PROLACTIN: CPT

## 2025-03-07 PROCEDURE — 36415 COLL VENOUS BLD VENIPUNCTURE: CPT

## 2025-03-07 PROCEDURE — 86147 CARDIOLIPIN ANTIBODY EA IG: CPT

## 2025-03-07 PROCEDURE — 86146 BETA-2 GLYCOPROTEIN ANTIBODY: CPT

## 2025-03-10 LAB
APTT: 27.9 SEC
B2 GLYCOPROT I IGG AB: <9 GPI IGG UNITS
B2 GLYCOPROT I IGM AB: <9 GPI IGM UNITS
CARDIOLIPIN IGG: <9 GPL U/ML
CARDIOLIPIN IGM: 12 MPL U/ML
DRVVT: 40.6 SEC
HEXAGONAL PHASE PHOSPHOLIPID: 6 SEC
INR: 1
PT: 10.6 SEC
THROMBIN TIME: 17.5 SEC

## 2025-04-29 NOTE — ADDENDUM NOTE
Addended by: CHRISTOPHER CHOWDHURY on: 2/12/2025 09:16 PM     Modules accepted: Orders     MI has an opening in [facility] 5/1 at 12:00.     Called and pt will come to [facility] 5/1/25 at noon

## 2025-05-02 ENCOUNTER — OFFICE VISIT (OUTPATIENT)
Dept: OBGYN CLINIC | Facility: CLINIC | Age: 35
End: 2025-05-02
Payer: COMMERCIAL

## 2025-05-02 VITALS — DIASTOLIC BLOOD PRESSURE: 68 MMHG | BODY MASS INDEX: 30 KG/M2 | WEIGHT: 167 LBS | SYSTOLIC BLOOD PRESSURE: 118 MMHG

## 2025-05-02 DIAGNOSIS — Z12.4 SCREENING FOR CERVICAL CANCER: ICD-10-CM

## 2025-05-02 DIAGNOSIS — O26.20 RECURRENT PREGNANCY LOSS, ANTEPARTUM CONDITION OR COMPLICATION (HCC): ICD-10-CM

## 2025-05-02 DIAGNOSIS — Z01.419 ENCOUNTER FOR ANNUAL ROUTINE GYNECOLOGICAL EXAMINATION: Primary | ICD-10-CM

## 2025-05-02 PROBLEM — O20.9 VAGINAL BLEEDING AFFECTING EARLY PREGNANCY (HCC): Status: RESOLVED | Noted: 2025-02-05 | Resolved: 2025-05-02

## 2025-05-02 PROBLEM — O99.210 OBESITY IN PREGNANCY (HCC): Status: RESOLVED | Noted: 2025-01-31 | Resolved: 2025-05-02

## 2025-05-02 PROCEDURE — 99395 PREV VISIT EST AGE 18-39: CPT | Performed by: OBSTETRICS & GYNECOLOGY

## 2025-05-02 PROCEDURE — 87624 HPV HI-RISK TYP POOLED RSLT: CPT | Performed by: OBSTETRICS & GYNECOLOGY

## 2025-05-02 PROCEDURE — 88175 CYTOPATH C/V AUTO FLUID REDO: CPT | Performed by: OBSTETRICS & GYNECOLOGY

## 2025-05-02 NOTE — PROGRESS NOTES
GYN ANNUAL      HPI: Patient is a 34 year old  here for annual gyn exam. Cycles regular, q28-29 days with bleeding x5 days. No pelvic pain. No abnormal vaginal discharge or bleeding.     She was recently treated for ectopic pregnancy w/ methotrexate. She has been feeling well. Not currently using hormonal contraception but she does track her fertility and tries to abstain during the fertile window.       OB History    Para Term  AB Living   3    3    SAB IAB Ectopic Multiple Live Births   1 1 1        # Outcome Date GA Lbr Kristopher/2nd Weight Sex Type Anes PTL Lv   3 SAB 2024     Biochemical      2 Ectopic  7w0d          1 IAB                  Current Medications[1]    Past Medical History[2]  Past Surgical History[3]  Allergies[4]  Family History[5]      Review of Systems:  Review of Systems   Constitutional:  Negative for appetite change and fever.   Respiratory:  Negative for chest tightness and shortness of breath.    Cardiovascular:  Negative for chest pain, palpitations and leg swelling.   Gastrointestinal:  Negative for abdominal pain, constipation and diarrhea.   Endocrine: Negative for cold intolerance, heat intolerance, polydipsia and polyuria.   Genitourinary: Negative.  Negative for dyspareunia, dysuria, genital sores, menstrual problem, pelvic pain, urgency and vaginal discharge.   Musculoskeletal:  Negative for myalgias.   Neurological: Negative.  Negative for dizziness and headaches.   Psychiatric/Behavioral:  Negative for dysphoric mood and suicidal ideas.           LMP 2024 (Exact Date)     Exam:   GENERAL: well developed, well nourished, in no apparent distress  SKIN: left axilla with tracts and nodules in various stages of healing c/w HS, right axilla with mild disease, left inguinal fold with few well-healed tracts  HEENT: normal  LUNGS: respiration unlabored  CARDIOVASCULAR: no peripheral edema or varicosities, skin warm and dry  BREASTS: bilaterally nontender, no  palpable masses, no nipple discharge, no skin changes, no axillary adenopathy  ABDOMEN: Soft, non distended; non tender, no masses  GYNE/:   External Genitalia: normal, no lesions, good perineal support  Urethra: meatus normal  Bladder: well supported  Vagina: normal mucosa, no lesions, moderate white discharge   Uterus: normal size, mobile, nontender  Cervix: normal os, no lesions or bleeding  Adnexa: normal size, bilaterally nontender, no palpable masses  Cul-de-sac: normal  R/V: normal perineum, no hemorrhoids  EXTREMITIES: nontender without edema        A/P: Patient is 34 year old female here for well-woman exam.       #Annual exam   -Breast and pelvic exam as above  -Family planning: fertility awareness, desires pregnancy by the end of the year  -STI screening: declines  -Pap screening: collected - reviewed recommendation for screening q5 years as long as Paps remain normal  -Immunizations: Gardasil completed    #HS  -dermatology referral placed    #Preconception  -s/p 2 anembryonic pregnancies, 1 ectopic pregnancy  -remote hx CT, treated  -workup: APLS labs, karyotype, TSH, prolactin normal. A1C ordered today  -no recent ultrasound outside of pregnancy. Previously noted for intramural fibroids, no evidence of obstruction of endometrial cavity  -recommend referral to [facility]    Follow up [facility]ACHARYN      Ying Goodwin DO                    [1]   Current Outpatient Medications   Medication Sig Dispense Refill    ascorbic acid 1000 MG Oral Tab Take 1 tablet (1,000 mg total) by mouth daily.      Cholecalciferol (VITAMIN D3) 25 MCG (1000 UT) Oral Cap Take 1 tablet by mouth daily.      Prenatal 27-0.8 MG Oral Tab Take 1 tablet by mouth daily.     [2]   Past Medical History:   Dysmenorrhea    Fibroids   [3] No past surgical history on file.  [4] No Known Allergies  [5]   Family History  Problem Relation Age of Onset    Diabetes Father

## 2025-05-05 LAB — HPV E6+E7 MRNA CVX QL NAA+PROBE: NEGATIVE

## 2025-05-06 LAB — LAST PAP RESULT: NORMAL

## 2025-06-16 ENCOUNTER — TELEPHONE (OUTPATIENT)
Dept: OBGYN CLINIC | Facility: CLINIC | Age: 35
End: 2025-06-16

## 2025-06-16 NOTE — TELEPHONE ENCOUNTER
Incoming call from patient requesting a referral for a fertility doctor as discussed in previous visit.     Please assist. Thank you.